# Patient Record
Sex: FEMALE | URBAN - METROPOLITAN AREA
[De-identification: names, ages, dates, MRNs, and addresses within clinical notes are randomized per-mention and may not be internally consistent; named-entity substitution may affect disease eponyms.]

---

## 2018-05-14 ENCOUNTER — APPOINTMENT (RX ONLY)
Dept: URBAN - METROPOLITAN AREA CLINIC 26 | Facility: CLINIC | Age: 63
Setting detail: DERMATOLOGY
End: 2018-05-14

## 2018-05-14 VITALS — SYSTOLIC BLOOD PRESSURE: 112 MMHG | HEART RATE: 86 BPM | DIASTOLIC BLOOD PRESSURE: 77 MMHG

## 2018-05-14 PROBLEM — Z85.828 PERSONAL HISTORY OF OTHER MALIGNANT NEOPLASM OF SKIN: Status: ACTIVE | Noted: 2018-05-14

## 2018-05-14 PROBLEM — C44.319 BASAL CELL CARCINOMA OF SKIN OF OTHER PARTS OF FACE: Status: ACTIVE | Noted: 2018-05-14

## 2018-05-14 PROBLEM — K21.9 GASTRO-ESOPHAGEAL REFLUX DISEASE WITHOUT ESOPHAGITIS: Status: ACTIVE | Noted: 2018-05-14

## 2018-05-14 PROCEDURE — 13132 CMPLX RPR F/C/C/M/N/AX/G/H/F: CPT

## 2018-05-14 PROCEDURE — ? MOHS SURGERY

## 2018-05-14 PROCEDURE — 17311 MOHS 1 STAGE H/N/HF/G: CPT

## 2018-05-14 NOTE — PROCEDURE: MOHS SURGERY
Bcc Superficial Histology Text: The dermis contains distinctive tumor cell masses of various shapes and sizes composed of cells with large oval or elongated nuclei with relatively little cytoplasm.  The nuclei are homogenous.  There is no pronounced variation in size or intensity of staining and no significant anaplastic appearance.  The cells at the outer aspect of the tumor masses show palisading of nuclei.  Tumor masses are surrounded by a connective tissue stroma and in some areas there is retraction artifact of the tumor nodule away from the surrounding stroma.  A superficial pattern is noted.

## 2018-05-14 NOTE — PROCEDURE: MOHS SURGERY
Mixed Superficial And Nodular Bcc Histology Text: The dermis contains distinctive tumor cell masses of various shapes and sizes composed of cells with large oval or elongated nuclei with relatively little cytoplasm.  The nuclei are homogenous.  There is no pronounced variation in size or intensity of staining and no significant anaplastic appearance.  The cells at the outer aspect of the tumor masses show palisading of nuclei.  Tumor masses are surrounded by a connective tissue stroma and in some areas there is retraction artifact of the tumor nodule away from the surrounding stroma.  A mixed superficial and nodular pattern is noted.

## 2020-06-22 NOTE — PROCEDURE: MOHS SURGERY
pt has heart failure, bp run high Consent (Spinal Accessory)/Introductory Paragraph: The rationale for Mohs was explained to the patient and consent was obtained. The risks, benefits and alternatives to therapy were discussed in detail. Specifically, the risks of damage to the spinal accessory nerve, infection, scarring, bleeding, prolonged wound healing, incomplete removal, allergy to anesthesia, and recurrence were addressed. Prior to the procedure, the treatment site was clearly identified and confirmed by the patient using a hand mirror. All components of Universal Protocol/PAUSE Rule completed.

## 2020-12-16 ENCOUNTER — APPOINTMENT (RX ONLY)
Dept: URBAN - METROPOLITAN AREA CLINIC 374 | Facility: CLINIC | Age: 65
Setting detail: DERMATOLOGY
End: 2020-12-16

## 2020-12-16 DIAGNOSIS — L57.0 ACTINIC KERATOSIS: ICD-10-CM

## 2020-12-16 PROBLEM — C44.529 SQUAMOUS CELL CARCINOMA OF SKIN OF OTHER PART OF TRUNK: Status: ACTIVE | Noted: 2020-12-16

## 2020-12-16 PROCEDURE — ? PRESCRIPTION MEDICATION MANAGEMENT

## 2020-12-16 PROCEDURE — 99213 OFFICE O/P EST LOW 20 MIN: CPT | Mod: 25

## 2020-12-16 PROCEDURE — 17262 DSTRJ MAL LES T/A/L 1.1-2.0: CPT

## 2020-12-16 PROCEDURE — ? COUNSELING

## 2020-12-16 PROCEDURE — ? CURETTAGE AND DESTRUCTION WITH PATHOLOGY

## 2020-12-16 PROCEDURE — ? PRESCRIPTION

## 2020-12-16 PROCEDURE — ? PHOTO-DOCUMENTATION

## 2020-12-16 RX ORDER — FLUOROURACIL 5 MG/G
CREAM TOPICAL QHS
Qty: 1 | Refills: 2 | Status: CANCELLED
Stop reason: CLARIF

## 2020-12-16 ASSESSMENT — LOCATION SIMPLE DESCRIPTION DERM
LOCATION SIMPLE: RIGHT CHEEK
LOCATION SIMPLE: RIGHT FOREHEAD
LOCATION SIMPLE: LEFT CHEEK

## 2020-12-16 ASSESSMENT — LOCATION ZONE DERM: LOCATION ZONE: FACE

## 2020-12-16 ASSESSMENT — LOCATION DETAILED DESCRIPTION DERM
LOCATION DETAILED: RIGHT INFERIOR LATERAL MALAR CHEEK
LOCATION DETAILED: LEFT INFERIOR CENTRAL MALAR CHEEK
LOCATION DETAILED: RIGHT MEDIAL FOREHEAD

## 2020-12-16 NOTE — PROCEDURE: PRESCRIPTION MEDICATION MANAGEMENT
Initiate Treatment: fluorouracil 5 % topical cream: Apply a thin layer QHS to AA of FACE x 4 weeks
Render In Strict Bullet Format?: No
Detail Level: Zone

## 2020-12-16 NOTE — PROCEDURE: CURETTAGE AND DESTRUCTION WITH PATHOLOGY
Detail Level: Detailed
Size Of Lesion After Curettage: 1.6
Anesthesia Type: 1% lidocaine with epinephrine
Cautery Type: electrodesiccation
Number Of Curettages: 1
What Was Performed First?: Curettage
Additional Information: (Optional): The wound was cleaned, and a pressure dressing was applied.  The patient received detailed post-op instructions.
Lab: 6
Histology Text: Following the procedure a portion of the curetted material was sent for histologic evaluation.
Biopsy Type: H and E
Path Notes (To The Dermatopathologist): Please check margins
Render Path Notes In Note?: No
Consent was obtained from the patient. The risks, benefits and alternatives to therapy were discussed in detail. Specifically, the risks of infection, scarring, bleeding, prolonged wound healing, nerve injury, incomplete removal, allergy to anesthesia and recurrence were addressed. Alternatives to ED&C, such as: surgical removal and XRT were also discussed.  Prior to the procedure, the treatment site was clearly identified and confirmed by the patient. All components of Universal Protocol/PAUSE Rule completed.
Post-Care Instructions: I reviewed with the patient in detail post-care instructions. Patient is to keep the area dry for 48 hours, and not to engage in any swimming until the area is healed. Should the patient develop any fevers, chills, bleeding, severe pain patient will contact the office immediately.
Billing Type: Third-Party Bill
Bill As A Line Item Or As Units: Line Item

## 2021-01-21 NOTE — PROCEDURE: MOHS SURGERY
PDMP reviewed; no aberrant behavior identified, prescription authorized. Keystone Flap Text: The defect edges were debeveled with a #15 scalpel blade.  Given the location of the defect, shape of the defect a keystone flap was deemed most appropriate.  Using a sterile surgical marker, an appropriate keystone flap was drawn incorporating the defect, outlining the appropriate donor tissue and placing the expected incisions within the relaxed skin tension lines where possible. The area thus outlined was incised deep to adipose tissue with a #15 scalpel blade.  The skin margins were undermined to an appropriate distance in all directions around the primary defect and laterally outward around the flap utilizing iris scissors.

## 2021-01-27 ENCOUNTER — APPOINTMENT (RX ONLY)
Dept: URBAN - METROPOLITAN AREA CLINIC 374 | Facility: CLINIC | Age: 66
Setting detail: DERMATOLOGY
End: 2021-01-27

## 2021-01-27 DIAGNOSIS — L57.0 ACTINIC KERATOSIS: ICD-10-CM

## 2021-01-27 PROBLEM — C44.92 SQUAMOUS CELL CARCINOMA OF SKIN, UNSPECIFIED: Status: ACTIVE | Noted: 2021-01-27

## 2021-01-27 PROCEDURE — ? PHOTO-DOCUMENTATION

## 2021-01-27 PROCEDURE — ? COUNSELING

## 2021-01-27 PROCEDURE — ? PRESCRIPTION MEDICATION MANAGEMENT

## 2021-01-27 PROCEDURE — ? LIQUID NITROGEN

## 2021-01-27 PROCEDURE — 99213 OFFICE O/P EST LOW 20 MIN: CPT | Mod: 25

## 2021-01-27 PROCEDURE — 17003 DESTRUCT PREMALG LES 2-14: CPT

## 2021-01-27 PROCEDURE — 17000 DESTRUCT PREMALG LESION: CPT

## 2021-01-27 ASSESSMENT — LOCATION SIMPLE DESCRIPTION DERM
LOCATION SIMPLE: LEFT CHEEK
LOCATION SIMPLE: LEFT FOREHEAD
LOCATION SIMPLE: RIGHT CHEEK
LOCATION SIMPLE: RIGHT FOREHEAD
LOCATION SIMPLE: LEFT NOSE

## 2021-01-27 ASSESSMENT — LOCATION DETAILED DESCRIPTION DERM
LOCATION DETAILED: RIGHT INFERIOR LATERAL MALAR CHEEK
LOCATION DETAILED: RIGHT SUPERIOR NASAL CHEEK
LOCATION DETAILED: RIGHT MEDIAL FOREHEAD
LOCATION DETAILED: LEFT FOREHEAD
LOCATION DETAILED: LEFT NASAL SIDEWALL
LOCATION DETAILED: LEFT INFERIOR CENTRAL MALAR CHEEK

## 2021-01-27 ASSESSMENT — LOCATION ZONE DERM
LOCATION ZONE: FACE
LOCATION ZONE: NOSE

## 2021-01-27 NOTE — PROCEDURE: LIQUID NITROGEN
Consent: The patient's consent was obtained including but not limited to risks of crusting, scabbing, blistering, scarring, darker or lighter pigmentary change, recurrence, incomplete removal and infection.
Post-Care Instructions: I reviewed with the patient in detail post-care instructions. Patient is to wear sunprotection, and avoid picking at any of the treated lesions. Pt may apply Vaseline to crusted or scabbing areas.
Render Post-Care Instructions In Note?: no
Number Of Freeze-Thaw Cycles: 1 freeze-thaw cycle
Detail Level: Simple
Duration Of Freeze Thaw-Cycle (Seconds): 0

## 2021-01-27 NOTE — PROCEDURE: PRESCRIPTION MEDICATION MANAGEMENT
Initiate Treatment: Calciprotriene.005%/fluorouracil 5 % topical cream: apply a thin layer to face bid for a week, take a week off, and then if not healed do for another week
Render In Strict Bullet Format?: No
Detail Level: Zone

## 2021-03-10 ENCOUNTER — APPOINTMENT (RX ONLY)
Dept: URBAN - METROPOLITAN AREA CLINIC 374 | Facility: CLINIC | Age: 66
Setting detail: DERMATOLOGY
End: 2021-03-10

## 2021-03-10 DIAGNOSIS — B07.8 OTHER VIRAL WARTS: ICD-10-CM

## 2021-03-10 DIAGNOSIS — L57.0 ACTINIC KERATOSIS: ICD-10-CM

## 2021-03-10 PROCEDURE — 17110 DESTRUCTION B9 LES UP TO 14: CPT

## 2021-03-10 PROCEDURE — ? LIQUID NITROGEN

## 2021-03-10 PROCEDURE — ? PRESCRIPTION MEDICATION MANAGEMENT

## 2021-03-10 PROCEDURE — 99212 OFFICE O/P EST SF 10 MIN: CPT | Mod: 25

## 2021-03-10 ASSESSMENT — LOCATION SIMPLE DESCRIPTION DERM
LOCATION SIMPLE: LEFT CHEEK
LOCATION SIMPLE: RIGHT MIDDLE FINGER
LOCATION SIMPLE: RIGHT FOREHEAD
LOCATION SIMPLE: RIGHT CHEEK

## 2021-03-10 ASSESSMENT — LOCATION DETAILED DESCRIPTION DERM
LOCATION DETAILED: LEFT INFERIOR CENTRAL MALAR CHEEK
LOCATION DETAILED: LEFT MEDIAL MALAR CHEEK
LOCATION DETAILED: RIGHT PROXIMAL PALMAR MIDDLE FINGER
LOCATION DETAILED: RIGHT MEDIAL FOREHEAD
LOCATION DETAILED: RIGHT INFERIOR LATERAL MALAR CHEEK

## 2021-03-10 ASSESSMENT — LOCATION ZONE DERM
LOCATION ZONE: FINGER
LOCATION ZONE: FACE

## 2021-03-10 NOTE — PROCEDURE: PRESCRIPTION MEDICATION MANAGEMENT
Render In Strict Bullet Format?: No
Detail Level: Zone
Discontinue Regimen: Calciprotriene.005%/fluorouracil 5 % topical cream: apply a thin layer to face bid for a week, take a week off, and then if not healed do for another week (finished treatment)
Plan:  will mail celacyn

## 2021-08-25 ENCOUNTER — APPOINTMENT (RX ONLY)
Dept: URBAN - METROPOLITAN AREA CLINIC 374 | Facility: CLINIC | Age: 66
Setting detail: DERMATOLOGY
End: 2021-08-25

## 2021-08-25 DIAGNOSIS — D69.2 OTHER NONTHROMBOCYTOPENIC PURPURA: ICD-10-CM

## 2021-08-25 DIAGNOSIS — Z85.828 PERSONAL HISTORY OF OTHER MALIGNANT NEOPLASM OF SKIN: ICD-10-CM

## 2021-08-25 PROCEDURE — ? COUNSELING

## 2021-08-25 PROCEDURE — ? ADDITIONAL NOTES

## 2021-08-25 PROCEDURE — 99212 OFFICE O/P EST SF 10 MIN: CPT

## 2021-08-25 PROCEDURE — ? PRESCRIPTION MEDICATION MANAGEMENT

## 2021-08-25 PROCEDURE — ? PRESCRIPTION

## 2021-08-25 RX ORDER — TRIAMCINOLONE ACETONIDE 1 MG/G
CREAM TOPICAL BID
Qty: 1 | Refills: 3 | Status: ERX | COMMUNITY
Start: 2021-08-25

## 2021-08-25 RX ADMIN — TRIAMCINOLONE ACETONIDE: 1 CREAM TOPICAL at 00:00

## 2021-08-25 ASSESSMENT — LOCATION ZONE DERM
LOCATION ZONE: LEG
LOCATION ZONE: FACE

## 2021-08-25 ASSESSMENT — LOCATION SIMPLE DESCRIPTION DERM
LOCATION SIMPLE: RIGHT PRETIBIAL REGION
LOCATION SIMPLE: LEFT PRETIBIAL REGION
LOCATION SIMPLE: RIGHT EYEBROW

## 2021-08-25 ASSESSMENT — LOCATION DETAILED DESCRIPTION DERM
LOCATION DETAILED: LEFT PROXIMAL PRETIBIAL REGION
LOCATION DETAILED: RIGHT DISTAL PRETIBIAL REGION
LOCATION DETAILED: RIGHT CENTRAL EYEBROW

## 2021-08-25 NOTE — PROCEDURE: PRESCRIPTION MEDICATION MANAGEMENT
Detail Level: Zone
Render In Strict Bullet Format?: No
Initiate Treatment: triamcinolone acetonide 0.1 % topical cream: Apply bid to the rashes of the legs

## 2021-09-14 ENCOUNTER — APPOINTMENT (RX ONLY)
Dept: URBAN - METROPOLITAN AREA CLINIC 28 | Facility: CLINIC | Age: 66
Setting detail: DERMATOLOGY
End: 2021-09-14

## 2021-09-14 DIAGNOSIS — D69.2 OTHER NONTHROMBOCYTOPENIC PURPURA: ICD-10-CM | Status: IMPROVED

## 2021-09-14 DIAGNOSIS — L57.0 ACTINIC KERATOSIS: ICD-10-CM

## 2021-09-14 PROCEDURE — ? COUNSELING

## 2021-09-14 PROCEDURE — 99212 OFFICE O/P EST SF 10 MIN: CPT | Mod: 25

## 2021-09-14 PROCEDURE — ? PREMALIGNANT DESTRUCTION

## 2021-09-14 PROCEDURE — ? RECOMMENDATIONS

## 2021-09-14 PROCEDURE — 17003 DESTRUCT PREMALG LES 2-14: CPT

## 2021-09-14 PROCEDURE — 17000 DESTRUCT PREMALG LESION: CPT

## 2021-09-14 PROCEDURE — ? PRESCRIPTION MEDICATION MANAGEMENT

## 2021-09-14 ASSESSMENT — LOCATION SIMPLE DESCRIPTION DERM
LOCATION SIMPLE: LEFT PRETIBIAL REGION
LOCATION SIMPLE: RIGHT UPPER ARM
LOCATION SIMPLE: RIGHT PRETIBIAL REGION
LOCATION SIMPLE: LEFT THIGH
LOCATION SIMPLE: RIGHT HAND

## 2021-09-14 ASSESSMENT — LOCATION DETAILED DESCRIPTION DERM
LOCATION DETAILED: RIGHT DISTAL PRETIBIAL REGION
LOCATION DETAILED: RIGHT ULNAR DORSAL HAND
LOCATION DETAILED: LEFT PROXIMAL PRETIBIAL REGION
LOCATION DETAILED: RIGHT ANTERIOR LATERAL DISTAL UPPER ARM
LOCATION DETAILED: LEFT ANTERIOR DISTAL THIGH

## 2021-09-14 ASSESSMENT — TOTAL NUMBER OF LESIONS: # OF LESIONS?: 2

## 2021-09-14 ASSESSMENT — LOCATION ZONE DERM
LOCATION ZONE: HAND
LOCATION ZONE: LEG
LOCATION ZONE: ARM

## 2021-09-14 NOTE — PROCEDURE: PRESCRIPTION MEDICATION MANAGEMENT
Detail Level: Zone
Continue Regimen: triamcinolone acetonide 0.1 % cream: Apply bid to the rashes of the legs
Render In Strict Bullet Format?: No

## 2021-09-14 NOTE — PROCEDURE: PREMALIGNANT DESTRUCTION
Render Note In Bullet Format When Appropriate: No
Consent: The patient's consent was obtained including but not limited to risks of crusting, scabbing, blistering, scarring, darker or lighter pigmentary change, recurrence, incomplete removal and infection.
Anesthesia Volume In Cc: 0
Detail Level: Detailed
Post-Care Instructions: I reviewed with the patient in detail post-care instructions. Patient is to wear sunprotection, and avoid picking at any of the treated lesions. Pt may apply Vaseline to crusted or scabbing areas.
Method: liquid nitrogen

## 2021-09-14 NOTE — PROCEDURE: RECOMMENDATIONS
Detail Level: Zone
Render Risk Assessment In Note?: no
Recommendation Preamble: The following recommendations were made during the visit: OTC DerMend Moisturizing Bruise cream

## 2022-05-04 ENCOUNTER — APPOINTMENT (RX ONLY)
Dept: URBAN - METROPOLITAN AREA CLINIC 374 | Facility: CLINIC | Age: 67
Setting detail: DERMATOLOGY
End: 2022-05-04

## 2022-05-04 DIAGNOSIS — D69.2 OTHER NONTHROMBOCYTOPENIC PURPURA: ICD-10-CM | Status: STABLE

## 2022-05-04 DIAGNOSIS — L57.0 ACTINIC KERATOSIS: ICD-10-CM

## 2022-05-04 PROCEDURE — ? PRESCRIPTION MEDICATION MANAGEMENT

## 2022-05-04 PROCEDURE — ? PRESCRIPTION

## 2022-05-04 PROCEDURE — ? RECOMMENDATIONS

## 2022-05-04 PROCEDURE — 99212 OFFICE O/P EST SF 10 MIN: CPT | Mod: 25

## 2022-05-04 PROCEDURE — 17003 DESTRUCT PREMALG LES 2-14: CPT

## 2022-05-04 PROCEDURE — ? PREMALIGNANT DESTRUCTION

## 2022-05-04 PROCEDURE — 17000 DESTRUCT PREMALG LESION: CPT

## 2022-05-04 RX ORDER — EMOLLIENT COMBINATION NO.32
1 EMULSION, EXTENDED RELEASE TOPICAL
Qty: 225 | Refills: 3 | Status: ERX

## 2022-05-04 RX ORDER — FLUOROURACIL 40 MG/G
1 CREAM TOPICAL QHS
Qty: 40 | Refills: 2 | Status: ERX | COMMUNITY
Start: 2022-05-04

## 2022-05-04 RX ADMIN — FLUOROURACIL 1: 40 CREAM TOPICAL at 00:00

## 2022-05-04 ASSESSMENT — LOCATION ZONE DERM
LOCATION ZONE: LEG
LOCATION ZONE: TRUNK
LOCATION ZONE: ARM
LOCATION ZONE: FACE

## 2022-05-04 ASSESSMENT — LOCATION DETAILED DESCRIPTION DERM
LOCATION DETAILED: RIGHT SUPERIOR MEDIAL UPPER BACK
LOCATION DETAILED: LEFT LATERAL SUPERIOR CHEST
LOCATION DETAILED: RIGHT DISTAL PRETIBIAL REGION
LOCATION DETAILED: RIGHT DISTAL RADIAL DORSAL FOREARM
LOCATION DETAILED: LEFT PROXIMAL PRETIBIAL REGION
LOCATION DETAILED: RIGHT SUPERIOR UPPER BACK
LOCATION DETAILED: LEFT INFERIOR MEDIAL FOREHEAD
LOCATION DETAILED: RIGHT MEDIAL SUPERIOR CHEST

## 2022-05-04 ASSESSMENT — LOCATION SIMPLE DESCRIPTION DERM
LOCATION SIMPLE: LEFT FOREHEAD
LOCATION SIMPLE: RIGHT FOREARM
LOCATION SIMPLE: RIGHT PRETIBIAL REGION
LOCATION SIMPLE: RIGHT UPPER BACK
LOCATION SIMPLE: LEFT PRETIBIAL REGION
LOCATION SIMPLE: CHEST

## 2022-05-04 NOTE — PROCEDURE: PRESCRIPTION MEDICATION MANAGEMENT
Detail Level: Zone
Continue Regimen: triamcinolone acetonide 0.1 % cream: Apply bid to the rashes of the legs
Samples Given: Epiceram cream and cutemol emollient moisturizer
Render In Strict Bullet Format?: No
Initiate Treatment: Tolak 4 % cream: Use once a day at bed time/overnight for 30 days to affected areas on chest (one area at a time)

## 2022-05-04 NOTE — PROCEDURE: PREMALIGNANT DESTRUCTION
Detail Level: Detailed
Anesthesia Volume In Cc: 0
Render Post-Care In The Note: No
Consent: The patient's consent was obtained including but not limited to risks of crusting, scabbing, blistering, scarring, darker or lighter pigmentary change, recurrence, incomplete removal and infection.
Method: liquid nitrogen
Post-Care Instructions: I reviewed with the patient in detail post-care instructions. Patient is to wear sunprotection, and avoid picking at any of the treated lesions. Pt may apply Vaseline to crusted or scabbing areas.

## 2022-08-24 ENCOUNTER — APPOINTMENT (RX ONLY)
Dept: URBAN - METROPOLITAN AREA CLINIC 374 | Facility: CLINIC | Age: 67
Setting detail: DERMATOLOGY
End: 2022-08-24

## 2022-08-24 DIAGNOSIS — D69.2 OTHER NONTHROMBOCYTOPENIC PURPURA: ICD-10-CM

## 2022-08-24 DIAGNOSIS — L57.0 ACTINIC KERATOSIS: ICD-10-CM

## 2022-08-24 PROCEDURE — ? RECOMMENDATIONS

## 2022-08-24 PROCEDURE — 99213 OFFICE O/P EST LOW 20 MIN: CPT

## 2022-08-24 PROCEDURE — ? PRESCRIPTION MEDICATION MANAGEMENT

## 2022-08-24 ASSESSMENT — LOCATION DETAILED DESCRIPTION DERM
LOCATION DETAILED: RIGHT DISTAL RADIAL DORSAL FOREARM
LOCATION DETAILED: RIGHT DISTAL PRETIBIAL REGION
LOCATION DETAILED: LEFT PROXIMAL PRETIBIAL REGION
LOCATION DETAILED: LEFT INFERIOR MEDIAL FOREHEAD

## 2022-08-24 ASSESSMENT — LOCATION SIMPLE DESCRIPTION DERM
LOCATION SIMPLE: RIGHT PRETIBIAL REGION
LOCATION SIMPLE: LEFT PRETIBIAL REGION
LOCATION SIMPLE: RIGHT FOREARM
LOCATION SIMPLE: LEFT FOREHEAD

## 2022-08-24 ASSESSMENT — LOCATION ZONE DERM
LOCATION ZONE: FACE
LOCATION ZONE: ARM
LOCATION ZONE: LEG

## 2022-08-24 NOTE — PROCEDURE: PRESCRIPTION MEDICATION MANAGEMENT
Discontinue Regimen: Tolak 4 % cream: Use once a day at bed time/overnight for 30 days to affected areas on chest (one area at a time)
Detail Level: Zone
Render In Strict Bullet Format?: No
Samples Given: Christin
Initiate Treatment: Klysiri: Apply thin layer to forehead QHS x5 days (start in Fall)
Continue Regimen: triamcinolone acetonide 0.1 % cream: Apply bid to the rashes of the legs\\nEpiceram cream and cutemol emollient moisturizer
Abdominal Pain, N/V/D

## 2023-01-17 ENCOUNTER — RX ONLY (OUTPATIENT)
Age: 68
Setting detail: RX ONLY
End: 2023-01-17

## 2023-01-17 RX ORDER — EMOLLIENT COMBINATION NO.32
1 EMULSION, EXTENDED RELEASE TOPICAL
Qty: 225 | Refills: 3 | Status: ERX

## 2023-01-17 RX ORDER — TIRBANIBULIN 10 MG/G
OINTMENT TOPICAL
Qty: 5 | Refills: 0 | Status: ERX | COMMUNITY
Start: 2023-01-17

## 2023-02-01 ENCOUNTER — RX ONLY (OUTPATIENT)
Age: 68
Setting detail: RX ONLY
End: 2023-02-01

## 2023-02-01 RX ORDER — IMIQUIMOD 12.5 MG/.25G
CREAM TOPICAL
Qty: 24 | Refills: 0 | Status: ERX | COMMUNITY
Start: 2023-02-01

## 2023-04-05 ENCOUNTER — APPOINTMENT (RX ONLY)
Dept: URBAN - METROPOLITAN AREA CLINIC 374 | Facility: CLINIC | Age: 68
Setting detail: DERMATOLOGY
End: 2023-04-05

## 2023-04-05 DIAGNOSIS — L57.0 ACTINIC KERATOSIS: ICD-10-CM

## 2023-04-05 PROCEDURE — 17000 DESTRUCT PREMALG LESION: CPT

## 2023-04-05 PROCEDURE — ? PREMALIGNANT DESTRUCTION

## 2023-04-05 PROCEDURE — ? PRESCRIPTION MEDICATION MANAGEMENT

## 2023-04-05 PROCEDURE — 17003 DESTRUCT PREMALG LES 2-14: CPT

## 2023-04-05 ASSESSMENT — LOCATION DETAILED DESCRIPTION DERM
LOCATION DETAILED: RIGHT RADIAL DORSAL HAND
LOCATION DETAILED: RIGHT CENTRAL EYEBROW
LOCATION DETAILED: LEFT INFERIOR MEDIAL FOREHEAD
LOCATION DETAILED: LEFT INFERIOR LATERAL FOREHEAD
LOCATION DETAILED: LEFT FOREHEAD

## 2023-04-05 ASSESSMENT — LOCATION ZONE DERM
LOCATION ZONE: HAND
LOCATION ZONE: FACE

## 2023-04-05 ASSESSMENT — LOCATION SIMPLE DESCRIPTION DERM
LOCATION SIMPLE: RIGHT EYEBROW
LOCATION SIMPLE: LEFT FOREHEAD
LOCATION SIMPLE: RIGHT HAND

## 2023-04-05 NOTE — PROCEDURE: PRESCRIPTION MEDICATION MANAGEMENT
Discontinue Regimen: Klysiri: Apply thin layer to forehead QHS x5 days (start in Fall)
Detail Level: Zone
Render In Strict Bullet Format?: No

## 2023-04-05 NOTE — PROCEDURE: PREMALIGNANT DESTRUCTION
Post-Care Instructions: I reviewed with the patient in detail post-care instructions. Patient is to wear sunprotection, and avoid picking at any of the treated lesions. Pt may apply Vaseline to crusted or scabbing areas.
Anesthesia Volume In Cc: 0
Render Note In Bullet Format When Appropriate: No
Detail Level: Detailed
Method: liquid nitrogen
Consent: The patient's consent was obtained including but not limited to risks of crusting, scabbing, blistering, scarring, darker or lighter pigmentary change, recurrence, incomplete removal and infection.

## 2023-04-27 ENCOUNTER — TRANSCRIBE ORDERS (OUTPATIENT)
Dept: SCHEDULING | Age: 68
End: 2023-04-27

## 2023-04-27 DIAGNOSIS — M54.16 RADICULOPATHY, LUMBAR REGION: Primary | ICD-10-CM

## 2023-05-09 ENCOUNTER — HOSPITAL ENCOUNTER (OUTPATIENT)
Dept: RADIOLOGY | Facility: HOSPITAL | Age: 68
Discharge: HOME | End: 2023-05-09
Attending: PHYSICAL MEDICINE & REHABILITATION
Payer: MEDICARE

## 2023-05-09 DIAGNOSIS — M54.16 RADICULOPATHY, LUMBAR REGION: ICD-10-CM

## 2023-05-23 ENCOUNTER — HOSPITAL ENCOUNTER (OUTPATIENT)
Dept: RADIOLOGY | Facility: HOSPITAL | Age: 68
Discharge: HOME | End: 2023-05-23
Attending: PHYSICAL MEDICINE & REHABILITATION
Payer: MEDICARE

## 2023-05-23 DIAGNOSIS — M54.16 RADICULOPATHY, LUMBAR REGION: ICD-10-CM

## 2023-05-23 DIAGNOSIS — M25.552 PAIN IN LEFT HIP: ICD-10-CM

## 2023-05-23 PROCEDURE — 74018 RADEX ABDOMEN 1 VIEW: CPT

## 2023-08-23 ENCOUNTER — APPOINTMENT (RX ONLY)
Dept: URBAN - METROPOLITAN AREA CLINIC 374 | Facility: CLINIC | Age: 68
Setting detail: DERMATOLOGY
End: 2023-08-23

## 2023-08-23 DIAGNOSIS — L57.0 ACTINIC KERATOSIS: ICD-10-CM

## 2023-08-23 PROCEDURE — ? PRESCRIPTION MEDICATION MANAGEMENT

## 2023-08-23 PROCEDURE — ? PRESCRIPTION

## 2023-08-23 PROCEDURE — 99213 OFFICE O/P EST LOW 20 MIN: CPT

## 2023-08-23 RX ORDER — FLUOROURACIL 2 G/40G
1 CREAM TOPICAL QHS
Qty: 40 | Refills: 0 | Status: ERX | COMMUNITY
Start: 2023-08-23

## 2023-08-23 RX ADMIN — FLUOROURACIL 1: 2 CREAM TOPICAL at 00:00

## 2023-08-23 ASSESSMENT — LOCATION SIMPLE DESCRIPTION DERM
LOCATION SIMPLE: RIGHT EYEBROW
LOCATION SIMPLE: LEFT FOREHEAD

## 2023-08-23 ASSESSMENT — LOCATION DETAILED DESCRIPTION DERM
LOCATION DETAILED: LEFT INFERIOR MEDIAL FOREHEAD
LOCATION DETAILED: RIGHT CENTRAL EYEBROW
LOCATION DETAILED: LEFT FOREHEAD
LOCATION DETAILED: LEFT INFERIOR LATERAL FOREHEAD

## 2023-08-23 ASSESSMENT — LOCATION ZONE DERM: LOCATION ZONE: FACE

## 2023-08-23 NOTE — PROCEDURE: MIPS QUALITY
Detail Level: Detailed
Quality 111:Pneumonia Vaccination Status For Older Adults: Patient received any pneumococcal conjugate or polysaccharide vaccine on or after their 60th birthday and before the end of the measurement period
Quality 226: Preventive Care And Screening: Tobacco Use: Screening And Cessation Intervention: Patient screened for tobacco use and is an ex/non-smoker
Quality 431: Preventive Care And Screening: Unhealthy Alcohol Use - Screening: Patient not identified as an unhealthy alcohol user when screened for unhealthy alcohol use using a systematic screening method
Quality 47: Advance Care Plan: Advance Care Planning discussed and documented in the medical record; patient did not wish or was not able to name a surrogate decision maker or provide an advance care plan.

## 2023-08-23 NOTE — PROCEDURE: PRESCRIPTION MEDICATION MANAGEMENT
Detail Level: Zone
Render In Strict Bullet Format?: No
Initiate Treatment: Efudex 5 % topical cream Qhs: Use once a day QHS to AA or face ( Use once daily 2x week then after [place]  then twice daily for 2 weeks )

## 2023-08-30 ENCOUNTER — TRANSCRIBE ORDERS (OUTPATIENT)
Dept: REGISTRATION | Facility: REHABILITATION | Age: 68
End: 2023-08-30

## 2023-08-30 DIAGNOSIS — S06.0X0S CONCUSSION WITHOUT LOSS OF CONSCIOUSNESS, SEQUELA (CMS/HCC): Primary | ICD-10-CM

## 2023-08-30 DIAGNOSIS — R51.9 HEADACHE: ICD-10-CM

## 2023-08-30 DIAGNOSIS — R42 DIZZINESS AND GIDDINESS: ICD-10-CM

## 2023-08-31 ENCOUNTER — DOCUMENTATION (OUTPATIENT)
Dept: SOCIAL WORK | Facility: REHABILITATION | Age: 68
End: 2023-08-31
Payer: MEDICARE

## 2023-08-31 ENCOUNTER — TRANSCRIBE ORDERS (OUTPATIENT)
Dept: REGISTRATION | Facility: REHABILITATION | Age: 68
End: 2023-08-31

## 2023-08-31 DIAGNOSIS — S06.0X0S CONCUSSION WITHOUT LOSS OF CONSCIOUSNESS, SEQUELA (CMS/HCC): Primary | ICD-10-CM

## 2023-12-05 ENCOUNTER — APPOINTMENT (RX ONLY)
Dept: URBAN - METROPOLITAN AREA CLINIC 374 | Facility: CLINIC | Age: 68
Setting detail: DERMATOLOGY
End: 2023-12-05

## 2023-12-05 DIAGNOSIS — Z85.828 PERSONAL HISTORY OF OTHER MALIGNANT NEOPLASM OF SKIN: ICD-10-CM

## 2023-12-05 DIAGNOSIS — L57.0 ACTINIC KERATOSIS: ICD-10-CM | Status: INADEQUATELY CONTROLLED

## 2023-12-05 PROCEDURE — 17000 DESTRUCT PREMALG LESION: CPT

## 2023-12-05 PROCEDURE — 17003 DESTRUCT PREMALG LES 2-14: CPT

## 2023-12-05 PROCEDURE — ? PRESCRIPTION

## 2023-12-05 PROCEDURE — ? LIQUID NITROGEN

## 2023-12-05 PROCEDURE — ? ADDITIONAL NOTES

## 2023-12-05 PROCEDURE — 99212 OFFICE O/P EST SF 10 MIN: CPT | Mod: 25

## 2023-12-05 PROCEDURE — ? PRESCRIPTION MEDICATION MANAGEMENT

## 2023-12-05 PROCEDURE — ? COUNSELING

## 2023-12-05 PROCEDURE — ? PHOTO-DOCUMENTATION

## 2023-12-05 RX ORDER — FLUOROURACIL 5 MG/G
CREAM TOPICAL
Qty: 40 | Refills: 1 | Status: ERX | COMMUNITY
Start: 2023-12-05

## 2023-12-05 RX ADMIN — FLUOROURACIL: 5 CREAM TOPICAL at 00:00

## 2023-12-05 ASSESSMENT — LOCATION ZONE DERM
LOCATION ZONE: HAND
LOCATION ZONE: TRUNK
LOCATION ZONE: FACE

## 2023-12-05 ASSESSMENT — LOCATION DETAILED DESCRIPTION DERM
LOCATION DETAILED: LEFT INFERIOR LATERAL FOREHEAD
LOCATION DETAILED: RIGHT MEDIAL SUPERIOR CHEST
LOCATION DETAILED: RIGHT DORSAL MIDDLE FINGER METACARPOPHALANGEAL JOINT
LOCATION DETAILED: LEFT FOREHEAD
LOCATION DETAILED: RIGHT RADIAL DORSAL HAND
LOCATION DETAILED: LEFT CENTRAL TEMPLE
LOCATION DETAILED: LEFT INFERIOR MEDIAL FOREHEAD
LOCATION DETAILED: RIGHT CENTRAL EYEBROW

## 2023-12-05 ASSESSMENT — LOCATION SIMPLE DESCRIPTION DERM
LOCATION SIMPLE: LEFT FOREHEAD
LOCATION SIMPLE: CHEST
LOCATION SIMPLE: RIGHT EYEBROW
LOCATION SIMPLE: RIGHT HAND
LOCATION SIMPLE: LEFT TEMPLE

## 2023-12-05 NOTE — PROCEDURE: PRESCRIPTION MEDICATION MANAGEMENT
Detail Level: Zone
Render In Strict Bullet Format?: No
Initiate Treatment: 5-Fluorouracil cream: Apply to affected skin twice a day for 2-4 weeks until erosion occurs, can apply hydrocortisone 2.5 % ointment for discomfort secondary to the inflammatory response due to 5-FU treatment
Defer Treatment (Provide Reason For Deferment In Text Field Below): Patient declined cryotherapy today

## 2023-12-05 NOTE — PROCEDURE: LIQUID NITROGEN
Consent: The patient's consent was obtained including but not limited to risks of crusting, scabbing, blistering, scarring, darker or lighter pigmentary change, recurrence, incomplete removal and infection.
Duration Of Freeze Thaw-Cycle (Seconds): 20
Number Of Freeze-Thaw Cycles: 2 freeze-thaw cycles
Detail Level: Detailed
Show Aperture Variable?: Yes
Render Note In Bullet Format When Appropriate: No
Post-Care Instructions: I reviewed with the patient in detail post-care instructions. Patient is to wear sunprotection, and avoid picking at any of the treated lesions. Pt may apply Vaseline to crusted or scabbing areas.

## 2023-12-05 NOTE — PROCEDURE: ADDITIONAL NOTES
Detail Level: Zone
Render Risk Assessment In Note?: no
Additional Notes: History of Squamous cell carcinoma (SCC) on the right medial superior chest status post electrodessication and curettage performed on 12/16/20 by Dr. Nice.\\nOn exam today there is no evidence of recurrence.\\nPlan: continue clinical monitoring and photoprotection
Additional Notes: History of Basal cell carcinoma (BCC), morpheaform type on the left temple status post Mohs surgery performed on 05/14/18 by Dr. Hatfield\\nOn exam today there is no evidence of recurrence.\\nPlan: continue clinical monitoring and photoprotection
Additional Notes: Actinic keratoses, chronic and uncontrolled\\nPatient was educated regarding the pre-cancer skin lesions. Different therapeutic modalities with their risks and benefits were discussed. Patient opted for:\\nTopical treatment with 5-FU cream for AKs on the face and cryotherapy for the treatment of AKs on the right dorsal hand.

## 2023-12-27 ENCOUNTER — APPOINTMENT (RX ONLY)
Dept: URBAN - METROPOLITAN AREA CLINIC 374 | Facility: CLINIC | Age: 68
Setting detail: DERMATOLOGY
End: 2023-12-27

## 2023-12-27 DIAGNOSIS — L57.0 ACTINIC KERATOSIS: ICD-10-CM

## 2023-12-27 DIAGNOSIS — L85.3 XEROSIS CUTIS: ICD-10-CM

## 2023-12-27 PROCEDURE — ? PREMALIGNANT DESTRUCTION

## 2023-12-27 PROCEDURE — ? RECOMMENDATIONS

## 2023-12-27 PROCEDURE — 17000 DESTRUCT PREMALG LESION: CPT

## 2023-12-27 PROCEDURE — 99212 OFFICE O/P EST SF 10 MIN: CPT | Mod: 25

## 2023-12-27 PROCEDURE — 17003 DESTRUCT PREMALG LES 2-14: CPT

## 2023-12-27 PROCEDURE — ? COUNSELING

## 2023-12-27 PROCEDURE — ? PRESCRIPTION MEDICATION MANAGEMENT

## 2023-12-27 PROCEDURE — ? PRESCRIPTION

## 2023-12-27 RX ORDER — EMOLLIENT COMBINATION NO.32
EMULSION, EXTENDED RELEASE TOPICAL QDAY
Qty: 225 | Refills: 3 | Status: ERX | COMMUNITY
Start: 2023-12-27

## 2023-12-27 RX ORDER — FLUOROURACIL 2 G/40G
CREAM TOPICAL QHS
Qty: 40 | Refills: 1 | Status: ERX

## 2023-12-27 RX ADMIN — Medication: at 00:00

## 2023-12-27 ASSESSMENT — LOCATION DETAILED DESCRIPTION DERM
LOCATION DETAILED: SUPERIOR THORACIC SPINE
LOCATION DETAILED: RIGHT SUPERIOR CENTRAL MALAR CHEEK
LOCATION DETAILED: LEFT INFERIOR LATERAL FOREHEAD
LOCATION DETAILED: LEFT FOREHEAD
LOCATION DETAILED: RIGHT CENTRAL EYEBROW
LOCATION DETAILED: LEFT INFERIOR MEDIAL FOREHEAD

## 2023-12-27 ASSESSMENT — LOCATION ZONE DERM
LOCATION ZONE: FACE
LOCATION ZONE: TRUNK

## 2023-12-27 ASSESSMENT — LOCATION SIMPLE DESCRIPTION DERM
LOCATION SIMPLE: LEFT FOREHEAD
LOCATION SIMPLE: RIGHT CHEEK
LOCATION SIMPLE: RIGHT EYEBROW
LOCATION SIMPLE: UPPER BACK

## 2023-12-27 NOTE — PROCEDURE: PRESCRIPTION MEDICATION MANAGEMENT
Detail Level: Zone
Render In Strict Bullet Format?: No
Initiate Treatment: Efudex 5 % topical cream: Apply a thin layer QHS for 2 weeks then discontinue (sending to [id number])
Detail Level: Generalized
Initiate Treatment: EpiCeram topical emulsion, extended release: Apply a thin layer QDAY to dry skin of body

## 2023-12-27 NOTE — PROCEDURE: PREMALIGNANT DESTRUCTION
Post-Care Instructions: I reviewed with the patient in detail post-care instructions. Patient is to wear sunprotection, and avoid picking at any of the treated lesions. Pt may apply Vaseline to crusted or scabbing areas.
Detail Level: Detailed
Anesthesia Volume In Cc: 0
Consent: The patient's consent was obtained including but not limited to risks of crusting, scabbing, blistering, scarring, darker or lighter pigmentary change, recurrence, incomplete removal and infection.
Render Post-Care In The Note: No
Method: liquid nitrogen

## 2023-12-27 NOTE — PROCEDURE: RECOMMENDATIONS
Recommendation Preamble: The following recommendations were made during the visit:
Recommendations (Free Text): Exercising hand to help the nerves.
Detail Level: Zone
Render Risk Assessment In Note?: no

## 2024-04-25 ENCOUNTER — APPOINTMENT (RX ONLY)
Dept: URBAN - METROPOLITAN AREA CLINIC 374 | Facility: CLINIC | Age: 69
Setting detail: DERMATOLOGY
End: 2024-04-25

## 2024-04-25 DIAGNOSIS — D22 MELANOCYTIC NEVI: ICD-10-CM

## 2024-04-25 DIAGNOSIS — D485 NEOPLASM OF UNCERTAIN BEHAVIOR OF SKIN: ICD-10-CM

## 2024-04-25 DIAGNOSIS — L82.1 OTHER SEBORRHEIC KERATOSIS: ICD-10-CM

## 2024-04-25 DIAGNOSIS — D18.0 HEMANGIOMA: ICD-10-CM

## 2024-04-25 DIAGNOSIS — L81.4 OTHER MELANIN HYPERPIGMENTATION: ICD-10-CM

## 2024-04-25 DIAGNOSIS — L56.5 DISSEMINATED SUPERFICIAL ACTINIC POROKERATOSIS (DSAP): ICD-10-CM

## 2024-04-25 DIAGNOSIS — L57.0 ACTINIC KERATOSIS: ICD-10-CM

## 2024-04-25 DIAGNOSIS — Z71.89 OTHER SPECIFIED COUNSELING: ICD-10-CM

## 2024-04-25 PROBLEM — D22.72 MELANOCYTIC NEVI OF LEFT LOWER LIMB, INCLUDING HIP: Status: ACTIVE | Noted: 2024-04-25

## 2024-04-25 PROBLEM — D22.5 MELANOCYTIC NEVI OF TRUNK: Status: ACTIVE | Noted: 2024-04-25

## 2024-04-25 PROBLEM — D22.71 MELANOCYTIC NEVI OF RIGHT LOWER LIMB, INCLUDING HIP: Status: ACTIVE | Noted: 2024-04-25

## 2024-04-25 PROBLEM — D22.61 MELANOCYTIC NEVI OF RIGHT UPPER LIMB, INCLUDING SHOULDER: Status: ACTIVE | Noted: 2024-04-25

## 2024-04-25 PROBLEM — D18.01 HEMANGIOMA OF SKIN AND SUBCUTANEOUS TISSUE: Status: ACTIVE | Noted: 2024-04-25

## 2024-04-25 PROBLEM — D48.5 NEOPLASM OF UNCERTAIN BEHAVIOR OF SKIN: Status: ACTIVE | Noted: 2024-04-25

## 2024-04-25 PROBLEM — D22.62 MELANOCYTIC NEVI OF LEFT UPPER LIMB, INCLUDING SHOULDER: Status: ACTIVE | Noted: 2024-04-25

## 2024-04-25 PROBLEM — D22.39 MELANOCYTIC NEVI OF OTHER PARTS OF FACE: Status: ACTIVE | Noted: 2024-04-25

## 2024-04-25 PROCEDURE — 99213 OFFICE O/P EST LOW 20 MIN: CPT | Mod: 25

## 2024-04-25 PROCEDURE — ? BIOPSY BY SHAVE METHOD

## 2024-04-25 PROCEDURE — 11102 TANGNTL BX SKIN SINGLE LES: CPT

## 2024-04-25 PROCEDURE — 17003 DESTRUCT PREMALG LES 2-14: CPT

## 2024-04-25 PROCEDURE — ? PHOTO-DOCUMENTATION

## 2024-04-25 PROCEDURE — ? PREMALIGNANT DESTRUCTION

## 2024-04-25 PROCEDURE — ? FULL BODY SKIN EXAM

## 2024-04-25 PROCEDURE — ? COUNSELING

## 2024-04-25 PROCEDURE — 17000 DESTRUCT PREMALG LESION: CPT | Mod: 59

## 2024-04-25 PROCEDURE — ? SUNSCREEN RECOMMENDATIONS

## 2024-04-25 ASSESSMENT — LOCATION SIMPLE DESCRIPTION DERM
LOCATION SIMPLE: LEFT FOREHEAD
LOCATION SIMPLE: RIGHT UPPER BACK
LOCATION SIMPLE: RIGHT UPPER ARM
LOCATION SIMPLE: RIGHT FOREARM
LOCATION SIMPLE: CHEST
LOCATION SIMPLE: LEFT SHOULDER
LOCATION SIMPLE: LEFT UPPER BACK
LOCATION SIMPLE: UPPER BACK
LOCATION SIMPLE: RIGHT CHEEK
LOCATION SIMPLE: RIGHT THIGH
LOCATION SIMPLE: LEFT THIGH
LOCATION SIMPLE: LOWER BACK
LOCATION SIMPLE: ABDOMEN
LOCATION SIMPLE: LEFT UPPER ARM
LOCATION SIMPLE: RIGHT SHOULDER

## 2024-04-25 ASSESSMENT — LOCATION DETAILED DESCRIPTION DERM
LOCATION DETAILED: LEFT MEDIAL SUPERIOR CHEST
LOCATION DETAILED: LEFT ANTERIOR PROXIMAL UPPER ARM
LOCATION DETAILED: MIDDLE STERNUM
LOCATION DETAILED: LEFT POSTERIOR SHOULDER
LOCATION DETAILED: RIGHT POSTERIOR SHOULDER
LOCATION DETAILED: RIGHT CENTRAL MALAR CHEEK
LOCATION DETAILED: LEFT ANTERIOR DISTAL THIGH
LOCATION DETAILED: LEFT SUPERIOR MEDIAL UPPER BACK
LOCATION DETAILED: INFERIOR THORACIC SPINE
LOCATION DETAILED: LEFT INFERIOR FOREHEAD
LOCATION DETAILED: RIGHT SUPERIOR UPPER BACK
LOCATION DETAILED: LEFT SUPERIOR FOREHEAD
LOCATION DETAILED: RIGHT SUPERIOR MEDIAL UPPER BACK
LOCATION DETAILED: RIGHT SUPERIOR LATERAL MALAR CHEEK
LOCATION DETAILED: EPIGASTRIC SKIN
LOCATION DETAILED: LEFT SUPERIOR MEDIAL FOREHEAD
LOCATION DETAILED: LEFT ANTERIOR PROXIMAL THIGH
LOCATION DETAILED: RIGHT ANTERIOR PROXIMAL UPPER ARM
LOCATION DETAILED: RIGHT ANTERIOR DISTAL UPPER ARM
LOCATION DETAILED: RIGHT MEDIAL SUPERIOR CHEST
LOCATION DETAILED: RIGHT ANTERIOR DISTAL THIGH
LOCATION DETAILED: RIGHT DISTAL DORSAL FOREARM
LOCATION DETAILED: SUPERIOR LUMBAR SPINE

## 2024-04-25 ASSESSMENT — LOCATION ZONE DERM
LOCATION ZONE: TRUNK
LOCATION ZONE: LEG
LOCATION ZONE: FACE
LOCATION ZONE: ARM

## 2024-04-25 NOTE — PROCEDURE: PREMALIGNANT DESTRUCTION
Render Note In Bullet Format When Appropriate: No
Detail Level: Zone
Consent: The patient's consent was obtained including but not limited to risks of crusting, scabbing, blistering, scarring, darker or lighter pigmentary change, recurrence, incomplete removal and infection.
Anesthesia Volume In Cc: 0.5
Method: liquid nitrogen
Post-Care Instructions: I reviewed with the patient in detail post-care instructions. Patient is to wear sunprotection, and avoid picking at any of the treated lesions. Pt may apply Vaseline to crusted or scabbing areas.

## 2024-04-25 NOTE — PROCEDURE: BIOPSY BY SHAVE METHOD
Detail Level: Detailed
Depth Of Biopsy: dermis
Was A Bandage Applied: Yes
Size Of Lesion In Cm: 0
Biopsy Type: H and E
Biopsy Method: Dermablade
Anesthesia Type: 1% lidocaine with epinephrine
Anesthesia Volume In Cc: 0.3
Hemostasis: Electrocautery and Aluminum Chloride
Wound Care: Petrolatum
Dressing: pressure dressing
Destruction After The Procedure: No
Type Of Destruction Used: Curettage
Curettage Text: The wound bed was treated with curettage after the biopsy was performed.
Cryotherapy Text: The wound bed was treated with cryotherapy after the biopsy was performed.
Electrodesiccation Text: The wound bed was treated with electrodesiccation after the biopsy was performed.
Electrodesiccation And Curettage Text: The wound bed was treated with electrodesiccation and curettage after the biopsy was performed.
Silver Nitrate Text: The wound bed was treated with silver nitrate after the biopsy was performed.
Lab: 6
Consent: Written consent was obtained and risks were reviewed including but not limited to scarring, infection, bleeding, scabbing, incomplete removal, nerve damage and allergy to anesthesia.
Post-Care Instructions: I reviewed with the patient in detail post-care instructions. Patient is to keep the biopsy site dry overnight, and then apply bacitracin twice daily until healed. Patient may apply hydrogen peroxide soaks to remove any crusting.
Notification Instructions: Patient will be notified of biopsy results. However, patient instructed to call the office if not contacted within 2 weeks.
Billing Type: Third-Party Bill
Information: Selecting Yes will display possible errors in your note based on the variables you have selected. This validation is only offered as a suggestion for you. PLEASE NOTE THAT THE VALIDATION TEXT WILL BE REMOVED WHEN YOU FINALIZE YOUR NOTE. IF YOU WANT TO FAX A PRELIMINARY NOTE YOU WILL NEED TO TOGGLE THIS TO 'NO' IF YOU DO NOT WANT IT IN YOUR FAXED NOTE.

## 2024-06-05 ENCOUNTER — APPOINTMENT (RX ONLY)
Dept: URBAN - METROPOLITAN AREA CLINIC 374 | Facility: CLINIC | Age: 69
Setting detail: DERMATOLOGY
End: 2024-06-05

## 2024-06-05 DIAGNOSIS — L57.0 ACTINIC KERATOSIS: ICD-10-CM

## 2024-06-05 DIAGNOSIS — L82.0 INFLAMED SEBORRHEIC KERATOSIS: ICD-10-CM

## 2024-06-05 PROBLEM — D48.5 NEOPLASM OF UNCERTAIN BEHAVIOR OF SKIN: Status: ACTIVE | Noted: 2024-06-05

## 2024-06-05 PROCEDURE — 17000 DESTRUCT PREMALG LESION: CPT | Mod: 59

## 2024-06-05 PROCEDURE — ? PREMALIGNANT DESTRUCTION

## 2024-06-05 PROCEDURE — ? COUNSELING

## 2024-06-05 PROCEDURE — 11102 TANGNTL BX SKIN SINGLE LES: CPT | Mod: 59

## 2024-06-05 PROCEDURE — ? PHOTO-DOCUMENTATION

## 2024-06-05 PROCEDURE — 11103 TANGNTL BX SKIN EA SEP/ADDL: CPT | Mod: 59

## 2024-06-05 PROCEDURE — ? BENIGN DESTRUCTION

## 2024-06-05 PROCEDURE — 17110 DESTRUCTION B9 LES UP TO 14: CPT

## 2024-06-05 PROCEDURE — ? PATHOLOGY DISCUSSION

## 2024-06-05 PROCEDURE — ? BIOPSY BY SHAVE METHOD

## 2024-06-05 ASSESSMENT — LOCATION ZONE DERM
LOCATION ZONE: TRUNK
LOCATION ZONE: LEG
LOCATION ZONE: FACE
LOCATION ZONE: ARM

## 2024-06-05 ASSESSMENT — LOCATION DETAILED DESCRIPTION DERM
LOCATION DETAILED: LEFT SUPERIOR FOREHEAD
LOCATION DETAILED: RIGHT LATERAL SUPERIOR CHEST
LOCATION DETAILED: RIGHT DISTAL POSTERIOR UPPER ARM
LOCATION DETAILED: RIGHT ANTERIOR DISTAL THIGH

## 2024-06-05 ASSESSMENT — LOCATION SIMPLE DESCRIPTION DERM
LOCATION SIMPLE: RIGHT THIGH
LOCATION SIMPLE: LEFT FOREHEAD
LOCATION SIMPLE: RIGHT POSTERIOR UPPER ARM
LOCATION SIMPLE: CHEST

## 2024-06-05 NOTE — PROCEDURE: BENIGN DESTRUCTION
Medical Necessity Information: It is in your best interest to select a reason for this procedure from the list below. All of these items fulfill various CMS LCD requirements except the new and changing color options.
Render Post-Care Instructions In Note?: no
Treatment Number (Will Not Render If 0): 0
Detail Level: Detailed
Post-Care Instructions: I reviewed with the patient in detail post-care instructions. Patient is to wear sunprotection, and avoid picking at any of the treated lesions. Pt may apply Vaseline to crusted or scabbing areas.
Anesthesia Volume In Cc: 0.3
Medical Necessity Clause: This procedure was medically necessary because the lesions that were treated were:
Consent: The patient's consent was obtained including but not limited to risks of crusting, scabbing, blistering, scarring, darker or lighter pigmentary change, recurrence, incomplete removal and infection.

## 2024-06-05 NOTE — PROCEDURE: PREMALIGNANT DESTRUCTION
Method: liquid nitrogen
Consent: The patient's consent was obtained including but not limited to risks of crusting, scabbing, blistering, scarring, darker or lighter pigmentary change, recurrence, incomplete removal and infection.
Render Note In Bullet Format When Appropriate: No
Post-Care Instructions: I reviewed with the patient in detail post-care instructions. Patient is to wear sunprotection, and avoid picking at any of the treated lesions. Pt may apply Vaseline to crusted or scabbing areas.
Detail Level: Zone
Anesthesia Volume In Cc: 0.5

## 2024-06-25 ENCOUNTER — APPOINTMENT (RX ONLY)
Dept: URBAN - METROPOLITAN AREA CLINIC 26 | Facility: CLINIC | Age: 69
Setting detail: DERMATOLOGY
End: 2024-06-25

## 2024-06-25 DIAGNOSIS — L57.0 ACTINIC KERATOSIS: ICD-10-CM

## 2024-06-25 PROBLEM — C44.42 SQUAMOUS CELL CARCINOMA OF SKIN OF SCALP AND NECK: Status: ACTIVE | Noted: 2024-06-25

## 2024-06-25 PROBLEM — C44.329 SQUAMOUS CELL CARCINOMA OF SKIN OF OTHER PARTS OF FACE: Status: ACTIVE | Noted: 2024-06-25

## 2024-06-25 PROBLEM — D48.5 NEOPLASM OF UNCERTAIN BEHAVIOR OF SKIN: Status: ACTIVE | Noted: 2024-06-25

## 2024-06-25 PROCEDURE — ? CONSULTATION FOR MOHS SURGERY

## 2024-06-25 PROCEDURE — ? MOHS SURGERY

## 2024-06-25 PROCEDURE — 12042 INTMD RPR N-HF/GENIT2.6-7.5: CPT | Mod: 59

## 2024-06-25 PROCEDURE — 17311 MOHS 1 STAGE H/N/HF/G: CPT

## 2024-06-25 PROCEDURE — ? BIOPSY BY SHAVE METHOD

## 2024-06-25 PROCEDURE — 99213 OFFICE O/P EST LOW 20 MIN: CPT | Mod: 25

## 2024-06-25 PROCEDURE — 11102 TANGNTL BX SKIN SINGLE LES: CPT | Mod: 59

## 2024-06-25 ASSESSMENT — LOCATION SIMPLE DESCRIPTION DERM: LOCATION SIMPLE: LEFT FOREHEAD

## 2024-06-25 ASSESSMENT — LOCATION DETAILED DESCRIPTION DERM: LOCATION DETAILED: LEFT LATERAL FOREHEAD

## 2024-06-25 ASSESSMENT — LOCATION ZONE DERM: LOCATION ZONE: FACE

## 2024-06-25 NOTE — PROCEDURE: MOHS SURGERY
Mohs Case Number: 
Date Of Previous Biopsy (Optional): 06-05-24
Previous Accession (Optional): [id number]
Biopsy Photograph Reviewed: Yes
Referring Physician (Optional): Venecia Carter PA-C
Consent Type: Consent 1 (Standard)
Eye Shield Used: No
Initial Size Of Lesion: 1.1
X Size Of Lesion In Cm (Optional): 0.7
Number Of Stages: 1
Primary Defect Length In Cm (Final Defect Size - Required For Flaps/Grafts): 2
Primary Defect Width In Cm (Final Defect Size - Required For Flaps/Grafts): 1.4
Primary Defect Depth In Cm (Optional But Required For Some Insurers): 0
Repair Type: Intermediate Layered Repair
Which Instrument Did You Use For Dermabrasion?: Wire Brush
Which Eyelid Repair Cpt Are You Using?: 36499
Oculoplastic Surgeon Procedure Text (A): After obtaining clear surgical margins the patient was sent to oculoplastics for surgical repair.  The patient understands they will receive post-surgical care and follow-up from the referring physician's office.
Otolaryngologist Procedure Text (A): After obtaining clear surgical margins the patient was sent to otolaryngology for surgical repair.  The patient understands they will receive post-surgical care and follow-up from the referring physician's office.
Plastic Surgeon Procedure Text (A): After obtaining clear surgical margins the patient was sent to plastics for surgical repair.  The patient understands they will receive post-surgical care and follow-up from the referring physician's office.
Mid-Level Procedure Text (A): After obtaining clear surgical margins the patient was sent to a mid-level provider for surgical repair.  The patient understands they will receive post-surgical care and follow-up from the mid-level provider.
Provider Procedure Text (A): After obtaining clear surgical margins the defect was repaired by another provider.
Asc Procedure Text (A): After obtaining clear surgical margins the patient was sent to an ASC for surgical repair.  The patient understands they will receive post-surgical care and follow-up from the ASC physician.
Simple / Intermediate / Complex Repair - Final Wound Length In Cm: 5.2
Suturegard Retention Suture: 2-0 Nylon
Retention Suture Bite Size: 3 mm
Length To Time In Minutes Device Was In Place: 10
Undermining Type: Entire Wound
Debridement Text: The wound edges were debrided prior to proceeding with the closure to facilitate wound healing.
Helical Rim Text: The closure involved the helical rim.
Vermilion Border Text: The closure involved the vermilion border.
Nostril Rim Text: The closure involved the nostril rim.
Retention Suture Text: Retention sutures were placed to support the closure and prevent dehiscence.
Location Indication Override (Is Already Calculated Based On Selected Body Location): Area M
Area H Indication Text: Tumors in this location are included in Area H (eyelids, eyebrows, central face, nose, lips, chin, ear, pre-auricular, post-auricular, temple, genitalia, hands, feet, ankles and areola).  Tissue conservation is critical in these anatomic locations.
Area M Indication Text: Tumors in this location are included in Area M (cheek, forehead, scalp, neck, jawline and pretibial skin).  Mohs surgery is indicated for tumors in these anatomic locations.
Area L Indication Text: Tumors in this location are included in Area L (trunk and extremities).  Mohs surgery is indicated for larger tumors, or tumors with aggressive histologic features, in these anatomic locations.
Tumor Debulked?: scalpel
Depth Of Tumor Invasion (For Histology): tumor not visualized (deep and peripheral margins are clear of tumor)
Perineural Invasion (For Histology - Be Specific If Possible): absent
Special Stains Stage 1 - Results: Base On Clearance Noted Above
Stage 2: Additional Anesthesia Type: 1% lidocaine with 1:100,000 epinephrine
Staging Info: By selecting yes to the question above you will include information on AJCC 8 tumor staging in your Mohs note. Information on tumor staging will be automatically added for SCCs on the head and neck. AJCC 8 includes tumor size, tumor depth, perineural involvement and bone invasion.
Tumor Depth: Less than 6mm from granular layer and no invasion beyond the subcutaneous fat
Why Was The Change Made?: Please Select the Appropriate Response
Medical Necessity Statement: Based on my medical judgement, Mohs surgery is the most appropriate treatment for this cancer compared to other treatments.
Alternatives Discussed Intro (Do Not Add Period): I discussed alternative treatments to Mohs surgery and specifically discussed the risks and benefits of
Consent 1/Introductory Paragraph: The rationale for Mohs was explained to the patient and consent was obtained. The risks, benefits and alternatives to therapy were discussed in detail. Specifically, the risks of infection, scarring, bleeding, prolonged wound healing, incomplete removal, allergy to anesthesia, nerve injury and recurrence were addressed. Prior to the procedure, the treatment site was clearly identified and confirmed by the patient using a hand mirror. All components of Universal Protocol/PAUSE Rule completed.
Consent 2/Introductory Paragraph: I explained to the patient that excisional surgery with margin control is the standard treatment for melanoma.  Mohs Surgery, which is a form of excisional surgery with margin control, over the past few decades has become a treatment within that standard of care for melanoma.  Many now consider it the standard of care for melanoma, especially melanoma in situ, on the head and neck given its more complete margin examination, and evidence supporting higher cure rate compared with conventional excision.  In this case, initial margins of 0.6 cm, in excess of the standard margins of 0.5 cm were employed for the first stage of Mohs Surgery.  \\n\\nMohs surgery was explained to the patient and consent was obtained. The risks, benefits and alternatives to therapy were discussed in detail. Specifically, the risks of infection, scarring, bleeding, prolonged wound healing, incomplete removal, allergy to anesthesia, nerve injury and recurrence were addressed. Prior to the procedure, the treatment site was clearly identified and confirmed by the patient  using a hand mirror. All components of Universal Protocol/PAUSE Rule completed.  \\n\\nMART-1 (Melanoma Antigen Recognized by T-cells) antibody immunostaining was used during Mohs surgery as per standard protocol, in addition to routine processing of all specimens with hematoxylin and eosin. One or more of the reagents used in immunohistochemical testing in this case may not have been cleared or approved by the [place]. Food and Drug Administration (FDA). The FDA has determined that such clearance or approval is not necessary. These tests are used for clinical purposes. They should not be regarded as investigational or for research.
Consent 3/Introductory Paragraph: I gave the patient a chance to ask questions they had about the procedure.  Following this I explained the Mohs procedure and consent was obtained. The risks, benefits and alternatives to therapy were discussed in detail. Specifically, the risks of infection, scarring, bleeding, prolonged wound healing, incomplete removal, allergy to anesthesia, nerve injury and recurrence were addressed. Prior to the procedure, the treatment site was clearly identified and confirmed by the patient using a hand mirror. All components of Universal Protocol/PAUSE Rule completed.
Consent (Temporal Branch)/Introductory Paragraph: The rationale for Mohs was explained to the patient and consent was obtained. The risks, benefits and alternatives to therapy were discussed in detail. Specifically, the risks of damage to the temporal branch of the facial nerve, infection, scarring, bleeding, prolonged wound healing, incomplete removal, allergy to anesthesia, and recurrence were addressed. Prior to the procedure, the treatment site was clearly identified and confirmed by the patient using a hand mirror. All components of Universal Protocol/PAUSE Rule completed.
Consent (Marginal Mandibular)/Introductory Paragraph: The rationale for Mohs was explained to the patient and consent was obtained. The risks, benefits and alternatives to therapy were discussed in detail. Specifically, the risks of damage to the marginal mandibular branch of the facial nerve, infection, scarring, bleeding, prolonged wound healing, incomplete removal, allergy to anesthesia, and recurrence were addressed. Prior to the procedure, the treatment site was clearly identified and confirmed by the patient using a hand mirror. All components of Universal Protocol/PAUSE Rule completed.
Consent (Spinal Accessory)/Introductory Paragraph: The rationale for Mohs was explained to the patient and consent was obtained. The risks, benefits and alternatives to therapy were discussed in detail. Specifically, the risks of damage to the spinal accessory nerve, infection, scarring, bleeding, prolonged wound healing, incomplete removal, allergy to anesthesia, and recurrence were addressed. Prior to the procedure, the treatment site was clearly identified and confirmed by the patient using a hand mirror. All components of Universal Protocol/PAUSE Rule completed.
Consent (Near Eyelid Margin)/Introductory Paragraph: The rationale for Mohs was explained to the patient and consent was obtained. The risks, benefits and alternatives to therapy were discussed in detail. Specifically, the risks of ectropion or eyelid deformity, infection, scarring, bleeding, prolonged wound healing, incomplete removal, allergy to anesthesia, nerve injury and recurrence were addressed. Prior to the procedure, the treatment site was clearly identified and confirmed by the patient using a hand mirror. All components of Universal Protocol/PAUSE Rule completed.
Consent (Ear)/Introductory Paragraph: The rationale for Mohs was explained to the patient and consent was obtained. The risks, benefits and alternatives to therapy were discussed in detail. Specifically, the risks of ear deformity, infection, scarring, bleeding, prolonged wound healing, incomplete removal, allergy to anesthesia, nerve injury and recurrence were addressed. Prior to the procedure, the treatment site was clearly identified and confirmed by the patient using a hand mirror. All components of Universal Protocol/PAUSE Rule completed.
Consent (Nose)/Introductory Paragraph: The rationale for Mohs was explained to the patient and consent was obtained. The risks, benefits and alternatives to therapy were discussed in detail. Specifically, the risks of nasal deformity, changes in the flow of air through the nose, infection, scarring, bleeding, prolonged wound healing, incomplete removal, allergy to anesthesia, nerve injury and recurrence were addressed. Prior to the procedure, the treatment site was clearly identified and confirmed by the patient using a hand mirror. All components of Universal Protocol/PAUSE Rule completed.
Consent (Lip)/Introductory Paragraph: The rationale for Mohs was explained to the patient and consent was obtained. The risks, benefits and alternatives to therapy were discussed in detail. Specifically, the risks of lip deformity, changes in the oral aperture, infection, scarring, bleeding, prolonged wound healing, incomplete removal, allergy to anesthesia, nerve injury and recurrence were addressed. Prior to the procedure, the treatment site was clearly identified and confirmed by the patient using a hand mirror. All components of Universal Protocol/PAUSE Rule completed.
Consent (Scalp)/Introductory Paragraph: The rationale for Mohs was explained to the patient and consent was obtained. The risks, benefits and alternatives to therapy were discussed in detail. Specifically, the risks of alopecia in or around the treatment site, changes in hair growth pattern secondary to repair, infection, scarring, bleeding, prolonged wound healing, incomplete removal, allergy to anesthesia, nerve injury and recurrence were addressed. Prior to the procedure, the treatment site was clearly identified and confirmed by the patient using a hand mirror. All components of Universal Protocol/PAUSE Rule completed.
Detail Level: Detailed
Postop Diagnosis: same
Surgeon: Patricio Hatfield M.D.
Anesthesia Type: 1% lidocaine with epinephrine
Hemostasis: Electrodesiccation
Estimated Blood Loss (Cc): minimal
Repair Anesthesia Method: local infiltration
Anesthesia Volume In Cc: 6
Brow Lift Text: A midfrontal incision was made medially to the defect to allow access to the tissues just superior to the left eyebrow. Following careful dissection inferiorly in a supraperiosteal plane to the level of the left eyebrow, several 3-0 monocryl sutures were used to resuspend the eyebrow orbicularis oculi muscular unit to the superior frontal bone periosteum. This resulted in an appropriate reapproximation of static eyebrow symmetry and correction of the left brow ptosis.
Deep Sutures: 5-0 Vicryl
Epidermal Sutures: GluSeal
Suturegard Intro: Intraoperative tissue expansion was performed, utilizing the SUTUREGARD device, in order to reduce wound tension.
Suturegard Body: The suture ends were repeatedly re-tightened and re-clamped to achieve the desired tissue expansion.
Hemigard Intro: Due to skin fragility and wound tension, it was decided to use HEMIGARD adhesive retention suture devices to permit a linear closure. The skin was cleaned and dried for a 6cm distance away from the wound. Excessive hair, if present, was removed to allow for adhesion.
Hemigard Postcare Instructions: The HEMIGARD strips are to remain completely dry for at least 5-7 days.
Donor Site Anesthesia Type: same as repair anesthesia
Graft Basting Suture (Optional): 6-0 Prolene
Epidermal Closure Graft Donor Site (Optional): running
Graft Donor Site Bandage (Optional-Leave Blank If You Don't Want In Note): A pressure bandage with telfa was applied to the donor site.
Closure 2 Information: This tab is for additional flaps and grafts, including complex repair and grafts and complex repair and flaps. You can also specify a different location for the additional defect, if the location is the same you do not need to select a new one. We will insert the automated text for the repair you select below just as we do for solitary flaps and grafts. Please note that at this time if you select a location with a different insurance zone you will need to override the ICD10 and CPT if appropriate.
Closure 3 Information: This tab is for additional flaps and grafts above and beyond our usual structured repairs.  Please note if you enter information here it will not currently bill and you will need to add the billing information manually.
Wound Care: Mastisol
Dressing: steri-strips and pressure dressing
Unna Boot Text: An Unna boot was placed to help immobilize the limb and facilitate more rapid healing.
Home Suture Removal Text: Patient was provided instructions on removing sutures and will remove their sutures at home.  If they have any questions or difficulties they will call the office.
Post-Care Instructions: I reviewed with the patient in detail post-care instructions. Patient is not to engage in any strenuous activity for at least 48 hours, and is to avoid heavy lifting, strenuous exercise, or swimming for the next 14 days. Should the patient develop any fevers, chills, bleeding, or severe pain, the patient will contact the office immediately.
Pain Refusal Text: I offered to prescribe pain medication but the patient refused to take this medication.
Mauc Instructions: By selecting yes to the question below the MAUC number will be added into the note.  This will be calculated automatically based on the diagnosis chosen, the size entered, the body zone selected (H,M,L) and the specific indications you chose. You will also have the option to override the Mohs AUC if you disagree with the automatically calculated number and this option is found in the Case Summary tab.
Where Do You Want The Question To Include Opioid Counseling Located?: Case Summary Tab
Eye Protection Verbiage: Before proceeding with the stage, a plastic scleral shield was inserted. The globe was anesthetized with a few drops of 1% lidocaine with 1:100,000 epinephrine. Then, an appropriate sized scleral shield was chosen and coated with lacrilube ointment. The shield was gently inserted and left in place for the duration of each stage. After the stage was completed, the shield was gently removed.
Mohs Method Verbiage: After tumor debulking, and with each stage, an incision was made into a layer of healthy appearing skin around the clinically apparent tissue following the standard Mohs approach.  The specimen was harvested as a microscopically controlled layer.  A map was prepared to correspond to the area of skin from which it was excised.  The tissue was inked, divided as necessary, and prepared for frozen section processing.
Surgeon/Pathologist Verbiage (Will Incorporate Name Of Surgeon From Intro If Not Blank): operated in two distinct and integrated capacities as the surgeon and pathologist.
Mohs Histo Method Verbiage: Each section was then chromacoded and processed in the Mohs laboratory using the Mohs protocol and submitted for tissue processing with frozen sections prepared in the Mohs laboratory. The entire base and margins of the excised tissue were examined by the surgeon.
Subsequent Stages Histo Method Verbiage: Using a similar technique to that described above, a thin layer of tissue was removed from all areas where tumor was visible on the previous stage.  The tissue was again oriented, mapped, dyed, and processed as above.
Mohs Rapid Report Verbiage: The area of clinically evident tumor was marked with skin marking ink and appropriately hatched.  The initial incision was made following the Mohs approach through the skin.  The specimen was taken to the lab, divided into the necessary number of pieces, chromacoded and processed according to the Mohs protocol.  This was repeated in successive stages until a tumor free defect was achieved.
Complex Repair Preamble Text (Leave Blank If You Do Not Want): The wound edges and deep margins of the Mohs defect were debrided of excess dermis and adipose tissue.  Retention sutures encompassing deep dermal, adipose, and fascial layers were placed to alleviate the tension on the primary suture line and to decrease the potential for wound dehiscence.
Crescentic Complex Repair Preamble Text (Leave Blank If You Do Not Want): Three layered closure, including plication of the fascia underlying the defect, as well as extensive wide undermining were performed.
M-Plasty Complex Repair Preamble Text (Leave Blank If You Do Not Want): Extensive wide undermining was performed.
Crescentic Intermediate Repair Preamble Text (Leave Blank If You Do Not Want): Undermining was performed with blunt dissection.
Non-Graft Cartilage Fenestration Text: The cartilage was fenestrated with a 2mm punch biopsy to help facilitate healing.
Graft Cartilage Fenestration Text: The cartilage was fenestrated with a 2mm punch biopsy to help facilitate graft survival and healing.
Secondary Intention Text (Leave Blank If You Do Not Want): After discussion and consideration of repair and wound management options, the patient opted to allow the defect to heal by second intent.
No Repair - Repaired With Adjacent Surgical Defect Text (Leave Blank If You Do Not Want): After obtaining clear surgical margins the defect was repaired concurrently with another surgical defect which was in close approximation.
Adjacent Tissue Transfer Text: The defect edges were debeveled with a #15 scalpel blade.  Given the location of the defect and the proximity to free margins an adjacent tissue transfer was deemed most appropriate.  Using a sterile surgical marker, an appropriate flap was drawn incorporating the defect and placing the expected incisions within the relaxed skin tension lines where possible.    The area thus outlined was incised deep to adipose tissue with a #15 scalpel blade.  The skin margins were undermined to an appropriate distance in all directions utilizing iris scissors.
Advancement Flap (Single) Text: The defect edges were debeveled with a #15 scalpel blade.  Given the location of the defect and the proximity to free margins a single advancement flap was deemed most appropriate.  Using a sterile surgical marker, an appropriate advancement flap was drawn incorporating the defect and placing the expected incisions within the relaxed skin tension lines where possible.    The area thus outlined was incised deep to adipose tissue with a #15 scalpel blade and carried over to the primary defect.  The skin margins were undermined to an appropriate distance in all directions utilizing iris scissors.
Advancement Flap (Double) Text: The defect edges were debeveled with a #15 scalpel blade.  Given the location of the defect and the proximity to free margins a double advancement flap was deemed most appropriate.  Using a sterile surgical marker, the appropriate advancement flaps were drawn incorporating the defect and placing the expected incisions within the relaxed skin tension lines where possible.    The area thus outlined was incised deep to adipose tissue with a #15 scalpel blade and carried over to the primary defect.  The skin margins were undermined to an appropriate distance in all directions utilizing iris scissors.
Burow's Advancement Flap Text: The defect edges were debeveled with a #15 scalpel blade.  Given the location of the defect and the proximity to free margins a Burow's advancement flap was deemed most appropriate.  Using a sterile surgical marker, the appropriate advancement flap was drawn incorporating the defect and placing the expected incisions within the relaxed skin tension lines where possible.    The area thus outlined was incised deep to adipose tissue with a #15 scalpel blade.  The skin margins were undermined to an appropriate distance in all directions utilizing iris scissors.
Chonodrocutaneous Helical Advancement Flap Text: The defect edges were debeveled with a #15 scalpel blade.  Given the location of the defect and the proximity to free margins a chondrocutaneous helical advancement flap was deemed most appropriate.  Using a sterile surgical marker, the appropriate advancement flap was drawn incorporating the defect and placing the expected incisions within the relaxed skin tension lines where possible.    The area thus outlined was incised deep to adipose tissue with a #15 scalpel blade.  The skin margins were undermined to an appropriate distance in all directions utilizing iris scissors.
Crescentic Advancement Flap Text: The defect edges were debeveled with a #15 scalpel blade.  Given the location of the defect and the proximity to free margins a crescentic advancement flap was deemed most appropriate.  Using a sterile surgical marker, the appropriate advancement flap was drawn incorporating the defect and placing the expected incisions within the relaxed skin tension lines where possible.    The area thus outlined was incised deep to adipose tissue with a #15 scalpel blade.  The skin margins were undermined to an appropriate distance in all directions utilizing iris scissors.
A-T Advancement Flap Text: The defect edges were debeveled with a #15 scalpel blade.  Given the location of the defect, shape of the defect and the proximity to free margins an A-T advancement flap was deemed most appropriate.  Using a sterile surgical marker, an appropriate advancement flap was drawn incorporating the defect and placing the expected incisions within the relaxed skin tension lines where possible.    The area thus outlined was incised deep to adipose tissue with a #15 scalpel blade and carried over to the primary defect.  The skin margins were undermined to an appropriate distance in all directions utilizing iris scissors.
O-T Advancement Flap Text: The defect edges were debeveled with a #15 scalpel blade.  Given the location of the defect, shape of the defect and the proximity to free margins an O-T advancement flap was deemed most appropriate.  Using a sterile surgical marker, an appropriate advancement flap was drawn incorporating the defect and placing the expected incisions within the relaxed skin tension lines where possible.    The area thus outlined was incised deep to adipose tissue with a #15 scalpel blade and carried over to the primary defect.  The skin margins were undermined to an appropriate distance in all directions utilizing iris scissors.
O-L Flap Text: The defect edges were debeveled with a #15 scalpel blade.  Given the location of the defect, shape of the defect and the proximity to free margins an O-L flap was deemed most appropriate.  Using a sterile surgical marker, an appropriate advancement flap was drawn incorporating the defect and placing the expected incisions within the relaxed skin tension lines where possible.    The area thus outlined was incised deep to adipose tissue with a #15 scalpel blade.  The skin margins were undermined to an appropriate distance in all directions utilizing iris scissors.
O-Z Flap Text: The defect edges were debeveled with a #15 scalpel blade.  Given the location of the defect, shape of the defect and the proximity to free margins an O-Z flap was deemed most appropriate.  Using a sterile surgical marker, an appropriate transposition flap was drawn incorporating the defect and placing the expected incisions within the relaxed skin tension lines where possible. The area thus outlined was incised deep to adipose tissue with a #15 scalpel blade and carried over to the primary defect.  The skin margins were undermined to an appropriate distance in all directions utilizing iris scissors.
Double O-Z Flap Text: The defect edges were debeveled with a #15 scalpel blade.  Given the location of the defect, shape of the defect and the proximity to free margins a Double O-Z flap was deemed most appropriate.  Using a sterile surgical marker, an appropriate transposition flap was drawn incorporating the defect and placing the expected incisions within the relaxed skin tension lines where possible. The area thus outlined was incised deep to adipose tissue with a #15 scalpel blade.  The skin margins were undermined to an appropriate distance in all directions utilizing iris scissors.
V-Y Flap Text: The defect edges were debeveled with a #15 scalpel blade.  Given the location of the defect, shape of the defect and the proximity to free margins a V-Y flap was deemed most appropriate.  Using a sterile surgical marker, an appropriate advancement flap was drawn incorporating the defect and placing the expected incisions within the relaxed skin tension lines where possible.    The area thus outlined was incised deep to adipose tissue with a #15 scalpel blade.  The skin margins were undermined to an appropriate distance in all directions utilizing iris scissors.
Advancement-Rotation Flap Text: The defect edges were debeveled with a #15 scalpel blade.  Given the location of the defect, shape of the defect and the proximity to free margins an advancement-rotation flap was deemed most appropriate.  Using a sterile surgical marker, an appropriate flap was drawn incorporating the defect and placing the expected incisions within the relaxed skin tension lines where possible. The area thus outlined was incised deep to adipose tissue with a #15 scalpel blade.  The skin margins were undermined to an appropriate distance in all directions utilizing iris scissors.
Mercedes Flap Text: The defect edges were debeveled with a #15 scalpel blade.  Given the location of the defect, shape of the defect and the proximity to free margins a Mercedes flap was deemed most appropriate.  Using a sterile surgical marker, an appropriate advancement flap was drawn incorporating the defect and placing the expected incisions within the relaxed skin tension lines where possible. The area thus outlined was incised deep to adipose tissue with a #15 scalpel blade and carried over to the primary defect.  The skin margins were undermined to an appropriate distance in all directions utilizing iris scissors.
Modified Advancement Flap Text: The defect edges were debeveled with a #15 scalpel blade.  Given the location of the defect, shape of the defect and the proximity to free margins a three point advancement flap was deemed most appropriate.  Using a sterile surgical marker, an appropriate advancement flap was drawn incorporating the defect and placing the expected incisions within the relaxed skin tension lines where possible.   The area thus outlined was incised deep to adipose tissue with a #15 scalpel blade.  Three standing tissue cones were excised.  The skin margins were undermined to an appropriate distance in all directions utilizing iris scissors.
Mucosal Advancement Flap Text: Given the location of the defect, shape of the defect and the proximity to free margins a mucosal advancement flap was deemed most appropriate. Incisions were made with a 15 blade scalpel in the appropriate fashion along the cutaneous vermilion border and the mucosal lip. The remaining actinically damaged mucosal tissue was excised.  The mucosal advancement flap was then elevated to the gingival sulcus with care taken to preserve the neurovascular structures and advanced into the primary defect. Care was taken to ensure that precise realignment of the vermilion border was achieved.
Peng Advancement Flap Text: The defect edges were debeveled with a #15 scalpel blade.  Given the location of the defect, shape of the defect and the proximity to free margins a Peng advancement flap was deemed most appropriate.  Using a sterile surgical marker, an appropriate advancement flap was drawn incorporating the defect and placing the expected incisions within the relaxed skin tension lines where possible. The area thus outlined was incised deep to adipose tissue with a #15 scalpel blade and carried over to the primary defect.  The skin margins were undermined to an appropriate distance in all directions utilizing iris scissors.
Hatchet Flap Text: The defect edges were debeveled with a #15 scalpel blade.  Given the location of the defect, shape of the defect and the proximity to free margins a hatchet flap was deemed most appropriate.  Using a sterile surgical marker, an appropriate hatchet flap was drawn incorporating the defect and placing the expected incisions within the relaxed skin tension lines where possible.    The area thus outlined was incised deep to adipose tissue with a #15 scalpel blade and carried over to the primary defect.  The skin margins were undermined to an appropriate distance in all directions utilizing iris scissors.
Rotation Flap Text: The defect edges were debeveled with a #15 scalpel blade.  Given the location of the defect, shape of the defect and the proximity to free margins a rotation flap was deemed most appropriate.  Using a sterile surgical marker, an appropriate rotation flap was drawn incorporating the defect and placing the expected incisions within the relaxed skin tension lines where possible.    The area thus outlined was incised deep to adipose tissue with a #15 scalpel blade and carried over to the primary defect.  The skin margins were undermined to an appropriate distance in all directions utilizing iris scissors.
Bilateral Rotation Flap Text: The defect edges were debeveled with a #15 scalpel blade. Given the location of the defect, shape of the defect and the proximity to free margins a bilateral rotation flap was deemed most appropriate. Using a sterile surgical marker, an appropriate rotation flap was drawn incorporating the defect and placing the expected incisions within the relaxed skin tension lines where possible. The area thus outlined was incised deep to adipose tissue with a #15 scalpel blade. The skin margins were undermined to an appropriate distance in all directions utilizing iris scissors. Following this, the designed flap was carried over into the primary defect and sutured into place.
Spiral Flap Text: The defect edges were debeveled with a #15 scalpel blade.  Given the location of the defect, shape of the defect and the proximity to free margins a spiral flap was deemed most appropriate.  Using a sterile surgical marker, an appropriate rotation flap was drawn incorporating the defect and placing the expected incisions within the relaxed skin tension lines where possible. The area thus outlined was incised deep to adipose tissue with a #15 scalpel blade and carried over to the primary defect.  The skin margins were undermined to an appropriate distance in all directions utilizing iris scissors.
Staged Advancement Flap Text: The defect edges were debeveled with a #15 scalpel blade.  Given the location of the defect, shape of the defect and the proximity to free margins a staged advancement flap was deemed most appropriate.  Using a sterile surgical marker, an appropriate advancement flap was drawn incorporating the defect and placing the expected incisions within the relaxed skin tension lines where possible. The area thus outlined was incised deep to adipose tissue with a #15 scalpel blade.  The skin margins were undermined to an appropriate distance in all directions utilizing iris scissors.
Star Wedge Flap Text: The defect edges were debeveled with a #15 scalpel blade.  Given the location of the defect, shape of the defect and the proximity to free margins a star wedge flap was deemed most appropriate.  Using a sterile surgical marker, an appropriate rotation flap was drawn incorporating the defect and placing the expected incisions within the relaxed skin tension lines where possible. The area thus outlined was incised deep to adipose tissue with a #15 scalpel blade.  The skin margins were undermined to an appropriate distance in all directions utilizing iris scissors.
Transposition Flap Text: The defect edges were debeveled with a #15 scalpel blade.  Given the location of the defect and the proximity to free margins a transposition flap was deemed most appropriate.  Using a sterile surgical marker, an appropriate transposition flap was drawn incorporating the defect.    The area thus outlined was incised deep to adipose tissue with a #15 scalpel blade and carried over to the primary defect.  The skin margins were undermined to an appropriate distance in all directions utilizing iris scissors.
Muscle Hinge Flap Text: The defect edges were debeveled with a #15 scalpel blade.  Given the size, depth and location of the defect and the proximity to free margins a muscle hinge flap was deemed most appropriate.  Using a sterile surgical marker, an appropriate hinge flap was drawn incorporating the defect. The area thus outlined was incised with a #15 scalpel blade.  The skin margins were undermined to an appropriate distance in all directions utilizing iris scissors.
Mustarde Flap Text: The defect edges were debeveled with a #15 scalpel blade.  Given the size, depth and location of the defect and the proximity to free margins a Mustarde flap was deemed most appropriate.  Using a sterile surgical marker, an appropriate flap was drawn incorporating the defect. The area thus outlined was incised with a #15 scalpel blade and carried over to the primary defect.  The skin margins were undermined to an appropriate distance in all directions utilizing iris scissors.
Nasal Turnover Hinge Flap Text: The defect edges were debeveled with a #15 scalpel blade.  Given the size, depth, location of the defect and the defect being full thickness a nasal turnover hinge flap was deemed most appropriate.  Using a sterile surgical marker, an appropriate hinge flap was drawn incorporating the defect. The area thus outlined was incised with a #15 scalpel blade and carried over to the primary defect. The flap was designed to recreate the nasal mucosal lining and the alar rim. The skin margins were undermined to an appropriate distance in all directions utilizing iris scissors.
Nasalis-Muscle-Based Myocutaneous Island Pedicle Flap Text: Using a #15 blade, an incision was made around the donor flap to the level of the nasalis muscle. Wide lateral undermining was then performed in both the subcutaneous plane above the nasalis muscle, and in a submuscular plane just above periosteum. This allowed the formation of a free nasalis muscle axial pedicle (based on the angular artery) which was still attached to the actual cutaneous flap, increasing its mobility and vascular viability. Hemostasis was obtained with pinpoint electrocoagulation. The flap was mobilized into position and the pivotal anchor points positioned and stabilized with buried interrupted sutures. Subcutaneous and dermal tissues were closed in a multilayered fashion with sutures. Tissue redundancies were excised, and the epidermal edges were apposed without significant tension and sutured with sutures.
Nasalis Myocutaneous Flap Text: Using a #15 blade, an incision was made around the donor flap to the level of the nasalis muscle. Wide lateral undermining was then performed in both the subcutaneous plane above the nasalis muscle, and in a submuscular plane just above periosteum. This allowed the formation of a free nasalis muscle axial pedicle which was still attached to the actual cutaneous flap, increasing its mobility and vascular viability. Hemostasis was obtained with pinpoint electrocoagulation. The flap was mobilized into position and the pivotal anchor points positioned and stabilized with buried interrupted sutures. Subcutaneous and dermal tissues were closed in a multilayered fashion with sutures. Tissue redundancies were excised, and the epidermal edges were apposed without significant tension and sutured with sutures.
Nasolabial Transposition Flap Text: The defect edges were debeveled with a #15 scalpel blade.  Given the size, depth and location of the defect and the proximity to free margins a nasolabial transposition flap was deemed most appropriate. Using a sterile surgical marker, an appropriate flap was drawn incorporating the defect. The area thus outlined was incised with a #15 scalpel blade. The skin margins were undermined to an appropriate distance in all directions utilizing iris scissors. Following this, the designed flap was carried into the primary defect and sutured into place.
Orbicularis Oris Muscle Flap Text: The defect edges were debeveled with a #15 scalpel blade.  Given that the defect affected the competency of the oral sphincter an orbicularis oris muscle flap was deemed most appropriate to restore this competency and normal muscle function.  Using a sterile surgical marker, an appropriate flap was drawn incorporating the defect. The area thus outlined was incised with a #15 scalpel blade.
Melolabial Transposition Flap Text: The defect edges were debeveled with a #15 scalpel blade.  Given the location of the defect and the proximity to free margins a melolabial flap was deemed most appropriate.  Using a sterile surgical marker, an appropriate melolabial transposition flap was drawn incorporating the defect.    The area thus outlined was incised deep to adipose tissue with a #15 scalpel blade and carried over to the primary defect.  The skin margins were undermined to an appropriate distance in all directions utilizing iris scissors.
Rectangular Flap Text: The defect edges were debeveled with a #15 scalpel blade. Given the location of the defect and the proximity to free margins a rectangular flap was deemed most appropriate. Using a sterile surgical marker, an appropriate rectangular flap was drawn incorporating the defect. The area thus outlined was incised deep to adipose tissue with a #15 scalpel blade. The skin margins were undermined to an appropriate distance in all directions utilizing iris scissors. Following this, the designed flap was carried over into the primary defect and sutured into place.
Rhombic Flap Text: The defect edges were debeveled with a #15 scalpel blade.  Given the location of the defect and the proximity to free margins a rhombic flap was deemed most appropriate.  Using a sterile surgical marker, an appropriate rhombic flap was drawn incorporating the defect.    The area thus outlined was incised deep to adipose tissue with a #15 scalpel blade and carried over to the primary defect.  The skin margins were undermined to an appropriate distance in all directions utilizing iris scissors.
Rhomboid Transposition Flap Text: The defect edges were debeveled with a #15 scalpel blade.  Given the location of the defect and the proximity to free margins a rhomboid transposition flap was deemed most appropriate.  Using a sterile surgical marker, an appropriate rhomboid flap was drawn incorporating the defect.    The area thus outlined was incised deep to adipose tissue with a #15 scalpel blade.  The skin margins were undermined to an appropriate distance in all directions utilizing iris scissors.
Bi-Rhombic Flap Text: The defect edges were debeveled with a #15 scalpel blade.  Given the location of the defect and the proximity to free margins a bi-rhombic flap was deemed most appropriate.  Using a sterile surgical marker, an appropriate rhombic flap was drawn incorporating the defect. The area thus outlined was incised deep to adipose tissue with a #15 scalpel blade.  The skin margins were undermined to an appropriate distance in all directions utilizing iris scissors.
Helical Rim Advancement Flap Text: The defect edges were debeveled with a #15 blade scalpel.  Given the location of the defect and the proximity to free margins (helical rim) a double helical rim advancement flap was deemed most appropriate.  Using a sterile surgical marker, the appropriate advancement flaps were drawn incorporating the defect and placing the expected incisions between the helical rim and antihelix where possible.  The area thus outlined was incised through and through with a #15 scalpel blade and carried over to the primary defect.  With a skin hook and iris scissors, the flaps were gently and sharply undermined and freed up.
Bilateral Helical Rim Advancement Flap Text: The defect edges were debeveled with a #15 blade scalpel.  Given the location of the defect and the proximity to free margins (helical rim) a bilateral helical rim advancement flap was deemed most appropriate.  Using a sterile surgical marker, the appropriate advancement flaps were drawn incorporating the defect and placing the expected incisions between the helical rim and antihelix where possible.  The area thus outlined was incised through and through with a #15 scalpel blade.  With a skin hook and iris scissors, the flaps were gently and sharply undermined and freed up.
Ear Star Wedge Flap Text: The defect edges were debeveled with a #15 blade scalpel.  Given the location of the defect and the proximity to free margins (helical rim) an ear star wedge flap was deemed most appropriate.  Using a sterile surgical marker, the appropriate flap was drawn incorporating the defect and placing the expected incisions between the helical rim and antihelix where possible.  The area thus outlined was incised through and through with a #15 scalpel blade.
Banner Transposition Flap Text: The defect edges were debeveled with a #15 scalpel blade.  Given the location of the defect and the proximity to free margins a Banner transposition flap was deemed most appropriate.  Using a sterile surgical marker, an appropriate flap drawn around the defect. The area thus outlined was incised deep to adipose tissue with a #15 scalpel blade.  The skin margins were undermined to an appropriate distance in all directions utilizing iris scissors.
Bilobed Flap Text: The defect edges were debeveled with a #15 scalpel blade.  Given the location of the defect and the proximity to free margins a bilobe flap was deemed most appropriate.  Using a sterile surgical marker, an appropriate bilobed flap drawn around the defect.    The area thus outlined was incised deep to adipose tissue with a #15 scalpel blade and carried over to the primary defect.  The skin margins were undermined to an appropriate distance in all directions utilizing iris scissors.
Bilobed Transposition Flap Text: The defect edges were debeveled with a #15 scalpel blade.  Given the location of the defect and the proximity to free margins a bilobed transposition flap was deemed most appropriate.  Using a sterile surgical marker, an appropriate bilobe flap drawn around the defect.    The area thus outlined was incised deep to adipose tissue with a #15 scalpel blade and carried over to the primary defect.  The skin margins were undermined to an appropriate distance in all directions utilizing iris scissors.
Trilobed Flap Text: The defect edges were debeveled with a #15 scalpel blade.  Given the location of the defect and the proximity to free margins a trilobed flap was deemed most appropriate.  Using a sterile surgical marker, an appropriate trilobed flap drawn around the defect.    The area thus outlined was incised deep to adipose tissue with a #15 scalpel blade and carried over to the primary defect.  The skin margins were undermined to an appropriate distance in all directions utilizing iris scissors.
Dorsal Nasal Flap Text: The defect edges were debeveled with a #15 scalpel blade.  Given the location of the defect and the proximity to free margins a dorsal nasal flap was deemed most appropriate.  Using a sterile surgical marker, an appropriate dorsal nasal flap was drawn around the defect.    The area thus outlined was incised deep to adipose tissue with a #15 scalpel blade and carried over to the primary defect.  The skin margins were undermined to an appropriate distance in all directions utilizing iris scissors.
Island Pedicle Flap Text: The defect edges were debeveled with a #15 scalpel blade.  Given the location of the defect, shape of the defect and the proximity to free margins an island pedicle advancement flap was deemed most appropriate.  Using a sterile surgical marker, an appropriate advancement flap was drawn incorporating the defect, outlining the appropriate donor tissue and placing the expected incisions within the relaxed skin tension lines where possible.    The area thus outlined was incised deep to adipose tissue with a #15 scalpel blade and carried over to the primary defect.  The skin margins were undermined to an appropriate distance in all directions around the primary defect and laterally outward around the island pedicle utilizing iris scissors.  There was minimal undermining beneath the pedicle flap.
Island Pedicle Flap With Canthal Suspension Text: The defect edges were debeveled with a #15 scalpel blade.  Given the location of the defect, shape of the defect and the proximity to free margins an island pedicle advancement flap was deemed most appropriate.  Using a sterile surgical marker, an appropriate advancement flap was drawn incorporating the defect, outlining the appropriate donor tissue and placing the expected incisions within the relaxed skin tension lines where possible. The area thus outlined was incised deep to adipose tissue with a #15 scalpel blade.  The skin margins were undermined to an appropriate distance in all directions around the primary defect and laterally outward around the island pedicle utilizing iris scissors.  There was minimal undermining beneath the pedicle flap. A suspension suture was placed in the canthal tendon to prevent tension and prevent ectropion.
Alar Island Pedicle Flap Text: The defect edges were debeveled with a #15 scalpel blade.  Given the location of the defect, shape of the defect and the proximity to the alar rim an island pedicle advancement flap was deemed most appropriate.  Using a sterile surgical marker, an appropriate advancement flap was drawn incorporating the defect, outlining the appropriate donor tissue and placing the expected incisions within the nasal ala running parallel to the alar rim. The area thus outlined was incised with a #15 scalpel blade and carried over to the primary defect.  The skin margins were undermined minimally to an appropriate distance in all directions around the primary defect and laterally outward around the island pedicle utilizing iris scissors.  There was minimal undermining beneath the pedicle flap.
Double Island Pedicle Flap Text: The defect edges were debeveled with a #15 scalpel blade.  Given the location of the defect, shape of the defect and the proximity to free margins a double island pedicle advancement flap was deemed most appropriate.  Using a sterile surgical marker, an appropriate advancement flap was drawn incorporating the defect, outlining the appropriate donor tissue and placing the expected incisions within the relaxed skin tension lines where possible.    The area thus outlined was incised deep to adipose tissue with a #15 scalpel blade and carried over to the primary defect.  The skin margins were undermined to an appropriate distance in all directions around the primary defect and laterally outward around the island pedicle utilizing iris scissors.  There was minimal undermining beneath the pedicle flap.
Island Pedicle Flap-Requiring Vessel Identification Text: The defect edges were debeveled with a #15 scalpel blade.  Given the location of the defect, shape of the defect and the proximity to free margins an island pedicle advancement flap was deemed most appropriate.  Using a sterile surgical marker, an appropriate advancement flap was drawn, based on the axial vessel mentioned above, incorporating the defect, outlining the appropriate donor tissue and placing the expected incisions within the relaxed skin tension lines where possible.    The area thus outlined was incised deep to adipose tissue with a #15 scalpel blade.  The skin margins were undermined to an appropriate distance in all directions around the primary defect and laterally outward around the island pedicle utilizing iris scissors.  There was minimal undermining beneath the pedicle flap.
Keystone Flap Text: The defect edges were debeveled with a #15 scalpel blade.  Given the location of the defect, shape of the defect a keystone flap was deemed most appropriate.  Using a sterile surgical marker, an appropriate keystone flap was drawn incorporating the defect, outlining the appropriate donor tissue and placing the expected incisions within the relaxed skin tension lines where possible. The area thus outlined was incised deep to adipose tissue with a #15 scalpel blade and carried over to the primary defect.  The skin margins were undermined to an appropriate distance in all directions around the primary defect and laterally outward around the flap utilizing iris scissors.
O-T Plasty Text: The defect edges were debeveled with a #15 scalpel blade.  Given the location of the defect, shape of the defect and the proximity to free margins an O-T plasty was deemed most appropriate.  Using a sterile surgical marker, an appropriate O-T plasty was drawn incorporating the defect and placing the expected incisions within the relaxed skin tension lines where possible.    The area thus outlined was incised deep to adipose tissue with a #15 scalpel blade.  The skin margins were undermined to an appropriate distance in all directions utilizing iris scissors.
O-Z Plasty Text: The defect edges were debeveled with a #15 scalpel blade.  Given the location of the defect, shape of the defect and the proximity to free margins an O-Z plasty (double transposition flap) was deemed most appropriate.  Using a sterile surgical marker, the appropriate transposition flaps were drawn incorporating the defect and placing the expected incisions within the relaxed skin tension lines where possible.    The area thus outlined was incised deep to adipose tissue with a #15 scalpel blade.  The skin margins were undermined to an appropriate distance in all directions utilizing iris scissors.  Hemostasis was achieved with electrocautery.  The flaps were then transposed into place, one clockwise and the other counterclockwise, and anchored with interrupted buried subcutaneous sutures.
Double O-Z Plasty Text: The defect edges were debeveled with a #15 scalpel blade.  Given the location of the defect, shape of the defect and the proximity to free margins a Double O-Z plasty (double transposition flap) was deemed most appropriate.  Using a sterile surgical marker, the appropriate transposition flaps were drawn incorporating the defect and placing the expected incisions within the relaxed skin tension lines where possible. The area thus outlined was incised deep to adipose tissue with a #15 scalpel blade.  The skin margins were undermined to an appropriate distance in all directions utilizing iris scissors.  Hemostasis was achieved with electrocautery.  The flaps were then transposed into place, one clockwise and the other counterclockwise, and anchored with interrupted buried subcutaneous sutures.
V-Y Plasty Text: The defect edges were debeveled with a #15 scalpel blade.  Given the location of the defect, shape of the defect and the proximity to free margins an V-Y advancement flap was deemed most appropriate.  Using a sterile surgical marker, an appropriate advancement flap was drawn incorporating the defect and placing the expected incisions within the relaxed skin tension lines where possible.    The area thus outlined was incised deep to adipose tissue with a #15 scalpel blade.  The skin margins were undermined to an appropriate distance in all directions utilizing iris scissors.
H Plasty Text: Given the location of the defect, shape of the defect and the proximity to free margins a H-plasty was deemed most appropriate for repair.  Using a sterile surgical marker, the appropriate advancement arms of the H-plasty were drawn incorporating the defect and placing the expected incisions within the relaxed skin tension lines where possible. The area thus outlined was incised deep to adipose tissue with a #15 scalpel blade. The skin margins were undermined to an appropriate distance in all directions utilizing iris scissors.  The opposing advancement arms were then advanced into place in opposite direction and anchored with interrupted buried subcutaneous sutures.
W Plasty Text: The lesion was extirpated to the level of the fat with a #15 scalpel blade.  Given the location of the defect, shape of the defect and the proximity to free margins a W-plasty was deemed most appropriate for repair.  Using a sterile surgical marker, the appropriate transposition arms of the W-plasty were drawn incorporating the defect and placing the expected incisions within the relaxed skin tension lines where possible.    The area thus outlined was incised deep to adipose tissue with a #15 scalpel blade.  The skin margins were undermined to an appropriate distance in all directions utilizing iris scissors.  The opposing transposition arms were then transposed into place in opposite direction and anchored with interrupted buried subcutaneous sutures.
Z Plasty Text: The lesion was extirpated to the level of the fat with a #15 scalpel blade.  Given the location of the defect, shape of the defect and the proximity to free margins a Z-plasty was deemed most appropriate for repair.  Using a sterile surgical marker, the appropriate transposition arms of the Z-plasty were drawn incorporating the defect and placing the expected incisions within the relaxed skin tension lines where possible.    The area thus outlined was incised deep to adipose tissue with a #15 scalpel blade.  The skin margins were undermined to an appropriate distance in all directions utilizing iris scissors.  The opposing transposition arms were then transposed into place in opposite direction and anchored with interrupted buried subcutaneous sutures.
Double Z Plasty Text: The lesion was extirpated to the level of the fat with a #15 scalpel blade. Given the location of the defect, shape of the defect and the proximity to free margins a double Z-plasty was deemed most appropriate for repair. Using a sterile surgical marker, the appropriate transposition arms of the double Z-plasty were drawn incorporating the defect and placing the expected incisions within the relaxed skin tension lines where possible. The area thus outlined was incised deep to adipose tissue with a #15 scalpel blade. The skin margins were undermined to an appropriate distance in all directions utilizing iris scissors. The opposing transposition arms were then transposed and carried over into place in opposite direction and anchored with interrupted buried subcutaneous sutures.
Zygomaticofacial Flap Text: Given the location of the defect, shape of the defect and the proximity to free margins a zygomaticofacial flap was deemed most appropriate for repair.  Using a sterile surgical marker, the appropriate flap was drawn incorporating the defect and placing the expected incisions within the relaxed skin tension lines where possible. The area thus outlined was incised deep to adipose tissue with a #15 scalpel blade with preservation of a vascular pedicle.  The skin margins were undermined to an appropriate distance in all directions utilizing iris scissors.  The flap was then placed into the defect and anchored with interrupted buried subcutaneous sutures.
Cheek Interpolation Flap Text: A decision was made to reconstruct the defect utilizing an interpolation axial flap and a staged reconstruction.  A telfa template was made of the defect.  This telfa template was then used to outline the Cheek Interpolation flap.  The donor area for the pedicle flap was then injected with anesthesia.  The flap was excised through the skin and subcutaneous tissue down to the layer of the underlying musculature.  The interpolation flap was carefully excised within this deep plane to maintain its blood supply.  The edges of the donor site were undermined.   The donor site was closed in a primary fashion.  The pedicle was then carried over to the primary defect, rotated into position and sutured.  Once the tube was sutured into place, adequate blood supply was confirmed with blanching and refill.  The pedicle was then wrapped with xeroform gauze and dressed appropriately with a telfa and gauze bandage to ensure continued blood supply and protect the attached pedicle.
Cheek-To-Nose Interpolation Flap Text: A decision was made to reconstruct the defect utilizing an interpolation axial flap and a staged reconstruction.  A telfa template was made of the defect.  This telfa template was then used to outline the Cheek-To-Nose Interpolation flap.  The donor area for the pedicle flap was then injected with anesthesia.  The flap was excised through the skin and subcutaneous tissue down to the layer of the underlying musculature.  The interpolation flap was carefully excised within this deep plane to maintain its blood supply.  The edges of the donor site were undermined.   The donor site was closed in a primary fashion.  The pedicle was then and carried over to the primary defect, rotated into position and sutured.  Once the tube was sutured into place, adequate blood supply was confirmed with blanching and refill.  The pedicle was then wrapped with xeroform gauze and dressed appropriately with a telfa and gauze bandage to ensure continued blood supply and protect the attached pedicle.
Interpolation Flap Text: A decision was made to reconstruct the defect utilizing an interpolation axial flap and a staged reconstruction.  A telfa template was made of the defect.  This telfa template was then used to outline the interpolation flap.  The donor area for the pedicle flap was then injected with anesthesia.  The flap was excised through the skin and subcutaneous tissue down to the layer of the underlying musculature.  The interpolation flap was carefully excised within this deep plane to maintain its blood supply.  The edges of the donor site were undermined.   The donor site was closed in a primary fashion.  The pedicle was then and carried over to the primary defect, rotated into position and sutured.  Once the tube was sutured into place, adequate blood supply was confirmed with blanching and refill.  The pedicle was then wrapped with xeroform gauze and dressed appropriately with a telfa and gauze bandage to ensure continued blood supply and protect the attached pedicle.
Melolabial Interpolation Flap Text: A decision was made to reconstruct the defect utilizing an interpolation axial flap and a staged reconstruction.  A telfa template was made of the defect.  This telfa template was then used to outline the melolabial interpolation flap.  The donor area for the pedicle flap was then injected with anesthesia.  The flap was excised through the skin and subcutaneous tissue down to the layer of the underlying musculature.  The pedicle flap was carefully excised within this deep plane to maintain its blood supply.  The edges of the donor site were undermined.   The donor site was closed in a primary fashion.  The pedicle was then carried over to the primary defect, rotated into position and sutured.  Once the tube was sutured into place, adequate blood supply was confirmed with blanching and refill.  The pedicle was then wrapped with xeroform gauze and dressed appropriately with a telfa and gauze bandage to ensure continued blood supply and protect the attached pedicle.
Mastoid Interpolation Flap Text: A decision was made to reconstruct the defect utilizing an interpolation axial flap and a staged reconstruction.  A telfa template was made of the defect.  This telfa template was then used to outline the mastoid interpolation flap.  The donor area for the pedicle flap was then injected with anesthesia.  The flap was excised through the skin and subcutaneous tissue down to the layer of the underlying musculature.  The pedicle flap was carefully excised within this deep plane to maintain its blood supply.  The edges of the donor site were undermined.   The donor site was closed in a primary fashion.  The pedicle was then and carried over to the primary defect, rotated into position and sutured.  Once the tube was sutured into place, adequate blood supply was confirmed with blanching and refill.  The pedicle was then wrapped with xeroform gauze and dressed appropriately with a telfa and gauze bandage to ensure continued blood supply and protect the attached pedicle.
Posterior Auricular Interpolation Flap Text: A decision was made to reconstruct the defect utilizing an interpolation axial flap and a staged reconstruction.  A telfa template was made of the defect.  This telfa template was then used to outline the posterior auricular interpolation flap.  The donor area for the pedicle flap was then injected with anesthesia.  The flap was excised through the skin and subcutaneous tissue down to the layer of the underlying musculature.  The pedicle flap was carefully excised within this deep plane to maintain its blood supply.  The edges of the donor site were undermined.   The donor site was closed in a primary fashion.  The pedicle was then rotated into position and sutured.  Once the tube was sutured into place, adequate blood supply was confirmed with blanching and refill.  The pedicle was then wrapped with xeroform gauze and dressed appropriately with a telfa and gauze bandage to ensure continued blood supply and protect the attached pedicle.
Paramedian Forehead Flap Text: A decision was made to reconstruct the defect utilizing an interpolation axial flap and a staged reconstruction.  A telfa template was made of the defect.  This telfa template was then used to outline the paramedian forehead pedicle flap.  The donor area for the pedicle flap was then injected with anesthesia.  The flap was excised through the skin and subcutaneous tissue down to the layer of the underlying musculature.  The pedicle flap was carefully excised within this deep plane to maintain its blood supply.  The edges of the donor site were undermined.   The donor site was closed in a primary fashion.  The pedicle was then rotated into position and sutured.  Once the tube was sutured into place, adequate blood supply was confirmed with blanching and refill.  The pedicle was then wrapped with xeroform gauze and dressed appropriately with a telfa and gauze bandage to ensure continued blood supply and protect the attached pedicle.
Abbe Flap (Upper To Lower Lip) Text: The defect of the lower lip was assessed and measured.  Given the location and size of the defect, an Abbe flap was deemed most appropriate.  Using a sterile surgical marker, an appropriate Abbe flap was measured and drawn on the upper lip. Local anesthesia was then infiltrated.  A scalpel was then used to incise the upper lip through and through the skin, vermilion, muscle and mucosa, leaving the flap pedicled on the opposite side.  The flap was then rotated and transferred to the lower lip defect.  The flap was then sutured into place with a three layer technique, closing the orbicularis oris muscle layer with subcutaneous buried sutures, followed by a mucosal layer and an epidermal layer.
Abbe Flap (Lower To Upper Lip) Text: The defect of the upper lip was assessed and measured.  Given the location and size of the defect, an Abbe flap was deemed most appropriate.  Using a sterile surgical marker, an appropriate Abbe flap was measured and drawn on the lower lip. Local anesthesia was then infiltrated. A scalpel was then used to incise the upper lip through and through the skin, vermilion, muscle and mucosa, leaving the flap pedicled on the opposite side.  The flap was then rotated and transferred to the lower lip defect.  The flap was then sutured into place with a three layer technique, closing the orbicularis oris muscle layer with subcutaneous buried sutures, followed by a mucosal layer and an epidermal layer.
Estlander Flap (Upper To Lower Lip) Text: The defect of the lower lip was assessed and measured.  Given the location and size of the defect, an Estlander flap was deemed most appropriate.  Using a sterile surgical marker, an appropriate Estlander flap was measured and drawn on the upper lip. Local anesthesia was then infiltrated. A scalpel was then used to incise the lateral aspect of the flap, through skin, muscle and mucosa, leaving the flap pedicled medially.  The flap was then rotated and positioned to fill the lower lip defect.  The flap was then sutured into place with a three layer technique, closing the orbicularis oris muscle layer with subcutaneous buried sutures, followed by a mucosal layer and an epidermal layer.
Cheiloplasty (Less Than 50%) Text: A decision was made to reconstruct the defect with a  cheiloplasty.  The defect was undermined extensively.  Additional obicularis oris muscle was excised with a 15 blade scalpel.  The defect was converted into a full thickness wedge, of less than 50% of the vertical height of the lip, to facilite a better cosmetic result.  Small vessels were then tied off with 5-0 monocyrl. The obicularis oris, superficial fascia, adipose and dermis were then reapproximated.  After the deeper layers were approximated the epidermis was reapproximated with particular care given to realign the vermilion border.
Cheiloplasty (Complex) Text: A decision was made to reconstruct the defect with a  cheiloplasty.  The defect was undermined extensively.  Additional obicularis oris muscle was excised with a 15 blade scalpel.  The defect was converted into a full thickness wedge to facilite a better cosmetic result.  Small vessels were then tied off with 5-0 monocyrl. The obicularis oris, superficial fascia, adipose and dermis were then reapproximated.  After the deeper layers were approximated the epidermis was reapproximated with particular care given to realign the vermilion border.
Ear Wedge Repair Text: A wedge excision was completed by carrying down an excision through the full thickness of the ear and cartilage with an inward facing Burow's triangle. The wound was then closed in a layered fashion.
Full Thickness Lip Wedge Repair (Flap) Text: Given the location of the defect and the proximity to free margins a full thickness wedge repair was deemed most appropriate.  Using a sterile surgical marker, the appropriate repair was drawn incorporating the defect and placing the expected incisions perpendicular to the vermilion border.  The vermilion border was also meticulously outlined to ensure appropriate reapproximation during the repair.  The area thus outlined was incised through and through with a #15 scalpel blade.  The muscularis and dermis were reaproximated with deep sutures following hemostasis. Care was taken to realign the vermilion border before proceeding with the superficial closure.  Once the vermilion was realigned the superfical and mucosal closure was finished.
Ftsg Text: The defect edges were debeveled with a #15 scalpel blade.  Given the location of the defect, shape of the defect and the proximity to free margins a full thickness skin graft was deemed most appropriate.  Using a sterile surgical marker, the primary defect shape was transferred to the donor site. The area thus outlined was incised deep to adipose tissue with a #15 scalpel blade.  The harvested graft was then trimmed of adipose tissue until only dermis and epidermis was left.  The skin margins of the secondary defect were undermined to an appropriate distance in all directions utilizing iris scissors.  The secondary defect was closed with interrupted buried subcutaneous sutures.  The skin edges were then re-apposed with running  sutures.  The skin graft was then placed in the primary defect and oriented appropriately.
Split-Thickness Skin Graft Text: The defect edges were debeveled with a #15 scalpel blade.  Given the location of the defect, shape of the defect and the proximity to free margins a split thickness skin graft was deemed most appropriate.  Using a sterile surgical marker, the primary defect shape was transferred to the donor site. The split thickness graft was then harvested.  The skin graft was then placed in the primary defect and oriented appropriately.
Pinch Graft Text: The defect edges were debeveled with a #15 scalpel blade. Given the location of the defect, shape of the defect and the proximity to free margins a pinch graft was deemed most appropriate. Using a sterile surgical marker, the primary defect shape was transferred to the donor site. The area thus outlined was incised deep to adipose tissue with a #15 scalpel blade.  The harvested graft was then trimmed of adipose tissue until only dermis and epidermis was left. The skin margins of the secondary defect were undermined to an appropriate distance in all directions utilizing iris scissors.  The secondary defect was closed with interrupted buried subcutaneous sutures.  The skin edges were then re-apposed with running  sutures.  The skin graft was then placed in the primary defect and oriented appropriately.
Burow's Graft Text: The defect edges were debeveled with a #15 scalpel blade.  Given the location of the defect, shape of the defect, the proximity to free margins and the presence of a standing cone deformity a Burow's skin graft was deemed most appropriate. The standing cone was removed and this tissue was then trimmed to the shape of the primary defect. The adipose tissue was also removed until only dermis and epidermis were left.  The skin margins of the secondary defect were undermined to an appropriate distance in all directions utilizing iris scissors.  The secondary defect was closed with interrupted buried subcutaneous sutures.  The skin edges were then re-apposed with running  sutures.  The skin graft was then placed in the primary defect and oriented appropriately.
Cartilage Graft Text: The defect edges were debeveled with a #15 scalpel blade.  Given the location of the defect, shape of the defect, the fact the defect involved a full thickness cartilage defect a cartilage graft was deemed most appropriate.  An appropriate donor site was identified, cleansed, and anesthetized. The cartilage graft was then harvested and transferred to the recipient site, oriented appropriately and then sutured into place.  The secondary defect was then repaired using a primary closure.
Composite Graft Text: The defect edges were debeveled with a #15 scalpel blade.  Given the location of the defect, shape of the defect, the proximity to free margins and the fact the defect was full thickness a composite graft was deemed most appropriate.  The defect was outline and then transferred to the donor site.  A full thickness graft was then excised from the donor site. The graft was then placed in the primary defect, oriented appropriately and then sutured into place.  The secondary defect was then repaired using a primary closure.
Epidermal Autograft Text: The defect edges were debeveled with a #15 scalpel blade.  Given the location of the defect, shape of the defect and the proximity to free margins an epidermal autograft was deemed most appropriate.  Using a sterile surgical marker, the primary defect shape was transferred to the donor site. The epidermal graft was then harvested.  The skin graft was then placed in the primary defect and oriented appropriately.
Dermal Autograft Text: The defect edges were debeveled with a #15 scalpel blade.  Given the location of the defect, shape of the defect and the proximity to free margins a dermal autograft was deemed most appropriate.  Using a sterile surgical marker, the primary defect shape was transferred to the donor site. The area thus outlined was incised deep to adipose tissue with a #15 scalpel blade.  The harvested graft was then trimmed of adipose and epidermal tissue until only dermis was left.  The skin graft was then placed in the primary defect and oriented appropriately.
Skin Substitute Text: The defect edges were debeveled with a #15 scalpel blade.  Given the location of the defect, shape of the defect and the proximity to free margins a skin substitute graft was deemed most appropriate.  The graft material was trimmed to fit the size of the defect. The graft was then placed in the primary defect and oriented appropriately.
Tissue Cultured Epidermal Autograft Text: The defect edges were debeveled with a #15 scalpel blade.  Given the location of the defect, shape of the defect and the proximity to free margins a tissue cultured epidermal autograft was deemed most appropriate.  The graft was then trimmed to fit the size of the defect.  The graft was then placed in the primary defect and oriented appropriately.
Xenograft Text: The defect edges were debeveled with a #15 scalpel blade.  Given the location of the defect, shape of the defect and the proximity to free margins a xenograft was deemed most appropriate.  The graft was then trimmed to fit the size of the defect.  The graft was then placed in the primary defect and oriented appropriately.
Purse String (Simple) Text: Given the location of the defect and the characteristics of the surrounding skin a purse string closure was deemed most appropriate.  Undermining was performed circumfirentially around the surgical defect.  A purse string suture was then placed and tightened.
Purse String (Intermediate) Text: Given the location of the defect and the characteristics of the surrounding skin a purse string intermediate closure was deemed most appropriate.  Undermining was performed circumfirentially around the surgical defect.  A purse string suture was then placed and tightened.
Partial Purse String (Simple) Text: Given the location of the defect and the characteristics of the surrounding skin a simple purse string closure was deemed most appropriate.  Undermining was performed circumfirentially around the surgical defect.  A purse string suture was then placed and tightened. Wound tension only allowed a partial closure of the circular defect.
Partial Purse String (Intermediate) Text: Given the location of the defect and the characteristics of the surrounding skin an intermediate purse string closure was deemed most appropriate.  Undermining was performed circumfirentially around the surgical defect.  A purse string suture was then placed and tightened. Wound tension only allowed a partial closure of the circular defect.
Localized Dermabrasion With Wire Brush Text: The patient was draped in routine manner.  Localized dermabrasion using 3 x 17 mm wire brush was performed in routine manner to papillary dermis. This spot dermabrasion is being performed to complete skin cancer reconstruction. It also will eliminate the other sun damaged precancerous cells that are known to be part of the regional effect of a lifetime's worth of sun exposure. This localized dermabrasion is therapeutic and should not be considered cosmetic in any regard.
Localized Dermabrasion With Sand Papertext: The patient was draped in routine manner.  Localized dermabrasion using sterile sand paper was performed in routine manner to papillary dermis. This spot dermabrasion is being performed to complete skin cancer reconstruction. It also will eliminate the other sun damaged precancerous cells that are known to be part of the regional effect of a lifetime's worth of sun exposure. This localized dermabrasion is therapeutic and should not be considered cosmetic in any regard.
Localized Dermabrasion With 15 Blade Text: The patient was draped in routine manner.  Localized dermabrasion using a 15 blade was performed in routine manner to papillary dermis. This spot dermabrasion is being performed to complete skin cancer reconstruction. It also will eliminate the other sun damaged precancerous cells that are known to be part of the regional effect of a lifetime's worth of sun exposure. This localized dermabrasion is therapeutic and should not be considered cosmetic in any regard.
Tarsorrhaphy Text: A tarsorrhaphy was performed using Frost sutures.
Intermediate Repair And Flap Additional Text (Will Appearing After The Standard Complex Repair Text): The intermediate repair was not sufficient to completely close the primary defect. The remaining additional defect was repaired with the flap mentioned below.
Intermediate Repair And Graft Additional Text (Will Appearing After The Standard Complex Repair Text): The intermediate repair was not sufficient to completely close the primary defect. The remaining additional defect was repaired with the graft mentioned below.
Complex Repair And Flap Additional Text (Will Appearing After The Standard Complex Repair Text): The complex repair was not sufficient to completely close the primary defect. The remaining additional defect was repaired with the flap mentioned below.
Complex Repair And Graft Additional Text (Will Appearing After The Standard Complex Repair Text): The complex repair was not sufficient to completely close the primary defect. The remaining additional defect was repaired with the graft mentioned below.
Eyelid Full Thickness Repair - 34976: The eyelid defect was full thickness which required a wedge repair of the eyelid. Special care was taken to ensure that the eyelid margin was realligned when placing sutures.
Eyelid Partial Thickness Repair - 55532: The eyelid defect was partial thickness which required a wedge repair of the eyelid. Special care was taken to ensure that the eyelid margin was realligned when placing sutures.
Manual Repair Warning Statement: We plan on removing the manually selected variable below in favor of our much easier automatic structured text blocks found in the previous tab. We decided to do this to help make the flow better and give you the full power of structured data. Manual selection is never going to be ideal in our platform and I would encourage you to avoid using manual selection from this point on, especially since I will be sunsetting this feature. It is important that you do one of two things with the customized text below. First, you can save all of the text in a word file so you can have it for future reference. Second, transfer the text to the appropriate area in the Library tab. Lastly, if there is a flap or graft type which we do not have you need to let us know right away so I can add it in before the variable is hidden. No need to panic, we plan to give you roughly 6 months to make the change.
Same Histology In Subsequent Stages Text: The pattern and morphology of the tumor is as described in the first stage.
No Residual Tumor Seen Histology Text: Normal epidermis, dermis, and subcutaneous and/or deeper tissue (where applicable) were noted on frozen sections during Mohs Surgery. There were no malignant cells seen in the sections examined.
Inflammation Suggestive Of Cancer Camouflage Histology Text: There was a dense lymphocytic infiltrate which prevented adequate histologic evaluation of adjacent structures.
Incidental Squamous Cell Carcinoma In Situ Histology Text: Incidental squamous cell carcinoma in situ was noted.  The epidermis shows acanthosis with elongation and thickening of the rete ridges.  There are varying degrees of atypia seen within the epidermal cells.  The anaplastic cells are enlarged with hyperchromatic nuclei.  Multiple nucleated epidermal cells are present and parakeratotic nuclei.  Multiple nucleated epidermal cells are present and parakeratotic cells are seen in the stratum corneum.  The anaplastic cells appear to be confined to the epidermis without penetration of the dermal-epidermal junction.
Incidental Actinic Keratosis Histology Text: Actinic keratosis was noted on frozen sections.  The specimen contains foci of disordered epidermal cells confined to the epidermis.  The cells have anaplastic nuclei with parakeratosis and a thickened granular cell layer.  There is papillomatosis and some hyperkeratosis.
Bcc Histology Text: The dermis contains distinctive tumor cell masses of various shapes and sizes composed of cells with large oval or elongated nuclei with relatively little cytoplasm.  The nuclei are homogenous.  There is no pronounced variation in size or intensity of staining and no significant anaplastic appearance.  The cells at the outer aspect of the tumor masses show palisading of nuclei.  Tumor masses are surrounded by a connective tissue stroma and in some areas there is retraction artifact of the tumor nodule away from the surrounding stroma.
Bcc Infiltrative Histology Text: The dermis contains distinctive tumor cell masses of various shapes and sizes composed of cells with large oval or elongated nuclei with relatively little cytoplasm.  The nuclei are homogenous.  There is no pronounced variation in size or intensity of staining and no significant anaplastic appearance.  The cells at the outer aspect of the tumor masses show palisading of nuclei.  Tumor masses are surrounded by a connective tissue stroma and in some areas there is retraction artifact of the tumor nodule away from the surrounding stroma.  The tumor nests are sharply angulated and demonstrating an infiltrating pattern extending deeply with surrounding fibrosis.
Bcc Infundibulocystic Histology Text: The dermis contains distinctive tumor cell masses of various shapes and sizes composed of cells with large oval or elongated nuclei with relatively little cytoplasm.  The nuclei are homogenous.  There is no pronounced variation in size or intensity of staining and no significant anaplastic appearance.  The cells at the outer aspect of the tumor masses show palisading of nuclei.  Tumor masses are surrounded by a connective tissue stroma and in some areas there is retraction artifact of the tumor nodule away from the surrounding stroma.  An infundibulocystic histology is noted.
Bcc Macronodular Histology Text: The dermis contains distinctive tumor cell masses of various shapes and sizes composed of cells with large oval or elongated nuclei with relatively little cytoplasm.  The nuclei are homogenous.  There is no pronounced variation in size or intensity of staining and no significant anaplastic appearance.  The cells at the outer aspect of the tumor masses show palisading of nuclei.  Tumor masses are surrounded by a connective tissue stroma and in some areas there is retraction artifact of the tumor nodule away from the surrounding stroma.  A macronodular histology is noted.
Bcc Micronodular Histology Text: The dermis contains distinctive tumor cell masses of various shapes and sizes composed of cells with large oval or elongated nuclei with relatively little cytoplasm.  The nuclei are homogenous.  There is no pronounced variation in size or intensity of staining and no significant anaplastic appearance.  The cells at the outer aspect of the tumor masses show palisading of nuclei.  Tumor masses are surrounded by a connective tissue stroma and in some areas there is retraction artifact of the tumor nodule away from the surrounding stroma.  A micronodular histology is noted.
Bcc  Morpheaform/Sclerosing Histology Text: The dermis contains distinctive tumor cell masses of various shapes and sizes composed of cells with large oval or elongated nuclei with relatively little cytoplasm.  The nuclei are homogenous.  There is no pronounced variation in size or intensity of staining and no significant anaplastic appearance.  The cells at the outer aspect of the tumor masses show palisading of nuclei.  Tumor masses are surrounded by a connective tissue stroma and in some areas there is retraction artifact of the tumor nodule away from the surrounding stroma.  The tumor nests are sharply angulated and demonstrating an infiltrating pattern extending deeply with surrounding fibrosis, consistent with morpheaform/sclerosing histology.
Bcc  Nodular Histology Text: The dermis contains distinctive tumor cell masses of various shapes and sizes composed of cells with large oval or elongated nuclei with relatively little cytoplasm.  The nuclei are homogenous.  There is no pronounced variation in size or intensity of staining and no significant anaplastic appearance.  The cells at the outer aspect of the tumor masses show palisading of nuclei.  Tumor masses are surrounded by a connective tissue stroma and in some areas there is retraction artifact of the tumor nodule away from the surrounding stroma.  A nodular histology is noted.
Bcc  Nodulocystic Histology Text: The dermis contains distinctive tumor cell masses of various shapes and sizes composed of cells with large oval or elongated nuclei with relatively little cytoplasm.  The nuclei are homogenous.  There is no pronounced variation in size or intensity of staining and no significant anaplastic appearance.  The cells at the outer aspect of the tumor masses show palisading of nuclei.  Tumor masses are surrounded by a connective tissue stroma and in some areas there is retraction artifact of the tumor nodule away from the surrounding stroma.  A nodulocystic histology is noted.
Bcc Pigmented Histology Text: The dermis contains distinctive tumor cell masses of various shapes and sizes composed of cells with large oval or elongated nuclei with relatively little cytoplasm.  The nuclei are homogenous.  There is no pronounced variation in size or intensity of staining and no significant anaplastic appearance.  The cells at the outer aspect of the tumor masses show palisading of nuclei.  Tumor masses are surrounded by a connective tissue stroma and in some areas there is retraction artifact of the tumor nodule away from the surrounding stroma.  Pigmented tumor cells are noted.
Bcc Superficial Histology Text: The dermis contains distinctive tumor cell masses of various shapes and sizes composed of cells with large oval or elongated nuclei with relatively little cytoplasm.  The nuclei are homogenous.  There is no pronounced variation in size or intensity of staining and no significant anaplastic appearance.  The cells at the outer aspect of the tumor masses show palisading of nuclei.  Tumor masses are surrounded by a connective tissue stroma and in some areas there is retraction artifact of the tumor nodule away from the surrounding stroma.  A superficial pattern is noted.
Bcc Superficial Pigmented Histology Text: The dermis contains distinctive tumor cell masses of various shapes and sizes composed of cells with large oval or elongated nuclei with relatively little cytoplasm.  The nuclei are homogenous.  There is no pronounced variation in size or intensity of staining and no significant anaplastic appearance.  The cells at the outer aspect of the tumor masses show palisading of nuclei.  Tumor masses are surrounded by a connective tissue stroma and in some areas there is retraction artifact of the tumor nodule away from the surrounding stroma.  A superficial pigmented pattern is noted.
Mixed Superficial And Nodular Bcc Histology Text: The dermis contains distinctive tumor cell masses of various shapes and sizes composed of cells with large oval or elongated nuclei with relatively little cytoplasm.  The nuclei are homogenous.  There is no pronounced variation in size or intensity of staining and no significant anaplastic appearance.  The cells at the outer aspect of the tumor masses show palisading of nuclei.  Tumor masses are surrounded by a connective tissue stroma and in some areas there is retraction artifact of the tumor nodule away from the surrounding stroma.  A mixed superficial and nodular pattern is noted.
Mixed Nodular And Infiltrative Bcc Histology Text: The dermis contains distinctive tumor cell masses of various shapes and sizes composed of cells with large oval or elongated nuclei with relatively little cytoplasm.  The nuclei are homogenous.  There is no pronounced variation in size or intensity of staining and no significant anaplastic appearance.  The cells at the outer aspect of the tumor masses show palisading of nuclei.  Tumor masses are surrounded by a connective tissue stroma and in some areas there is retraction artifact of the tumor nodule away from the surrounding stroma.  There is an admixture of nodular pattern BCC, as well as tumor nests that are sharply angulated and demonstrating an infiltrating pattern extending deeply with surrounding fibrosis.
Mixed Nodular And Micronodular Bcc Histology Text: The dermis contains distinctive tumor cell masses of various shapes and sizes composed of cells with large oval or elongated nuclei with relatively little cytoplasm.  The nuclei are homogenous.  There is no pronounced variation in size or intensity of staining and no significant anaplastic appearance.  The cells at the outer aspect of the tumor masses show palisading of nuclei.  Tumor masses are surrounded by a connective tissue stroma and in some areas there is retraction artifact of the tumor nodule away from the surrounding stroma.  A mixed nodular and micronodular pattern is noted.
Metatypical Bcc Histology Text: The dermis contains distinctive tumor cell masses of various shapes and sizes composed of cells with large oval or elongated nuclei with relatively little cytoplasm.  The nuclei are homogenous.  There is no pronounced variation in size or intensity of staining and no significant anaplastic appearance.  The cells at the outer aspect of the tumor masses show palisading of nuclei.  Tumor masses are surrounded by a connective tissue stroma and in some areas there is retraction artifact of the tumor nodule away from the surrounding stroma.  Squamous differentiation consistent with metatypical BCC is noted.
Scc Histology Text: There are irregular masses of epidermal cells in the upper dermis.  Within the tumor masses, there are varying proportions of normal squamous cells and anaplastic squamous cells.  The anaplastic cells have variation in size and shape with hyperplasia and hyperchromasia of the nuclei, absence of intracellular bridges and varying degrees of keratinization.
Scc Well Differentiated Histology Text: There are irregular masses of epidermal cells in the upper dermis.  Within the tumor masses, there are varying proportions of normal squamous cells and anaplastic squamous cells.  The anaplastic cells have variation in size and shape with hyperplasia and hyperchromasia of the nuclei, absence of intracellular bridges and varying degrees of keratinization.  A well differentiated pattern is noted.
Scc Moderately Differentiated Histology Text: There are irregular masses of epidermal cells in the upper dermis.  Within the tumor masses, there are varying proportions of normal squamous cells and anaplastic squamous cells.  The anaplastic cells have variation in size and shape with hyperplasia and hyperchromasia of the nuclei, absence of intracellular bridges and varying degrees of keratinization.  A moderately differentiated pattern is noted.
Scc Poorly Differentiated Histology Text: There are irregular masses of epidermal cells in the upper dermis.  Within the tumor masses, there are varying proportions of normal squamous cells and anaplastic squamous cells.  The anaplastic cells have variation in size and shape with hyperplasia and hyperchromasia of the nuclei, absence of intracellular bridges and varying degrees of keratinization.  A poorly differentiated pattern is noted.
Scc Acantholytic Histology Text: There are irregular masses of epidermal cells in the upper dermis.  Within the tumor masses, there are varying proportions of normal squamous cells and anaplastic squamous cells.  The anaplastic cells have variation in size and shape with hyperplasia and hyperchromasia of the nuclei, absence of intracellular bridges and varying degrees of keratinization.  An acantholytic pattern is noted.
Scc Ka Subtype Histology Text: On low power, a keratin filled invagination of the epidermis is noted to penetrate into the dermis.  There appear to be collections of epidermal cells with nuclear atypia in and around the base of the invagination.  These dyskeratotic cells show individual cells keratinization and appear eosinophilic.  A pronounced inflammatory cell infiltrate is noted in the surrounding dermis.  The surrounding epidermis extends as a buttress over the crater formed by the invagination.  At high magnification, the epidermal proliferations at the base of the crater show enlarged epidermal cells with hyperchromatic nuclei, consistent with SCC keratoacanthoma subtype.
Scc Spindle Histology Text: There are irregular masses of epidermal cells in the upper dermis.  Within the tumor masses, there are varying proportions of normal squamous cells and anaplastic squamous cells.  The anaplastic cells have variation in size and shape with hyperplasia and hyperchromasia of the nuclei, absence of intracellular bridges and varying degrees of keratinization.  A spindle cell pattern is noted.
Scc In Situ Histology Text: The epidermis shows acanthosis with elongation and thickening of the rete ridges.  There are varying degrees of atypia seen within the epidermal cells.  The anaplastic cells are enlarged with hyperchromatic nuclei.  Multiple nucleated epidermal cells are present and parakeratotic nuclei.  Multiple nucleated epidermal cells are present and parakeratotic cells are seen in the stratum corneum.  The anaplastic cells appear to be confined to the epidermis without penetration of the dermal- epidermal junction.
Lentigo Maligna Histology Text: Small clusters of uniformly round large melanocytes with atypical hyperchromatic nuclei and abundant cytoplasm are seen scattered throughout the lower levels of the epidermis.  Similar cells are seen singly in the upper layers of the epidermis.  There appears to be no penetration of these atypical melanocytes through the dermal epidermal junction and all cells appear to be confined to the epidermis.
Merkel Cell Carcinoma Histology Text: In the dermis and extending into the subcutaneous tissue, there are multiple anastomosing cords, clusters and sheets of basophilic cells.  These collections of cells do not appear to be connected to the epidermis.  Individual tumor cells have a very homogenous appearance.  They are closely spaced round ells that stain intensely blue with hematoxylin eosin.  These cells have vesiculare nuclei and scantly ill -defined cytoplasm.  Mitotic figures are seen.
Afx Histology Text: Mild to severe solar elastosis is noted.  Aggregates of dermal tumor cells  with moderate to severe pleomorphism with spindle, epithelioid, or multinucleated forms and atypical mitotic figures are seen.
Basosquamous Cell Carcinoma Histology Text: There are multiple islands of malignant cells throughout the dermis, exhibiting both basaloid and squamous differentiation. Some areas show BCC, with large and rounded basaloid cells, while others show SCC, with polygonal squamoid cells with abundant eosinophilic cytoplasm, large nuclei, and prominent nucleoli. The SCC component exhibited frequent atypical mitosis, cell and nuclear pleomorphism and hyperchromatism of the nuclei.
Dfsp Histology Text: In the dermis and subcutaneous fat there is a tumor mass consisting of cells with large spindle shaped nuclei embedded in varying amounts of collagen.  In some areas these cells are arranged in irregular bands while in others the intertwining bands result in a matte -like or a whorl -like fashion.  In some areas, the cells have enlarged nuclei with a moderate degree of atypia, and mitotic figures are seen.
Microcystic Adnexal Carcinoma Histology Text: Atypical epithelial cells are seen in the dermis arranged singly or in groups or organized as a cell-lined cystic cavity.  These cells are small in size with anaplastic appearing small nuclei.  When seen in the cystic configuration they resemble sweat ducts or glands.  In some areas the cells assume a spindle shape.  A slight stromal reaction is seen around the cells.
Sebaceous Carcinoma Histology Text: In the dermis, there are irregular lobules of varying size composed of undifferentiated sebaceous cells with foamy cytoplasm.  These cells, as well as their nuclei are of various sizes and shape.
Mart-1 - Positive Histology Text: MART-1 staining demonstrates areas of higher density and clustering of melanocytes with Pagetoid spread upwards within the epidermis. The surgical margins are positive for tumor cells.
Mart-1 - Negative Histology Text: MART-1 staining demonstrates a normal density and pattern of melanocytes along the dermal-epidermal junction. The surgical margins are negative for tumor cells.
Clia Id #: [id number]
Information: Selecting Yes will display possible errors in your note based on the variables you have selected. This validation is only offered as a suggestion for you. PLEASE NOTE THAT THE VALIDATION TEXT WILL BE REMOVED WHEN YOU FINALIZE YOUR NOTE. IF YOU WANT TO FAX A PRELIMINARY NOTE YOU WILL NEED TO TOGGLE THIS TO 'NO' IF YOU DO NOT WANT IT IN YOUR FAXED NOTE.
Bill 59 Modifier?: No - Continue to Bill 79 Modifier

## 2024-06-25 NOTE — PROCEDURE: BIOPSY BY SHAVE METHOD
Detail Level: Detailed
Depth Of Biopsy: dermis
Was A Bandage Applied: Yes
Size Of Lesion In Cm: 1.1
X Size Of Lesion In Cm: 0.7
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  Biopsy was performed today so that this area may be included in the Mohs procedure (along with the intervening scar) if pathology demonstrates SCCIS.  If it shows AK, topical treatment may be considered postoperatively.
Biopsy Type: H and E
Biopsy Method: 15 blade
Anesthesia Type: 1% lidocaine with 1:100,000 epinephrine
Anesthesia Volume In Cc: 1
Additional Anesthesia Volume In Cc (Will Not Render If 0): 0
Hemostasis: Electrodesiccation
Wound Care: Petrolatum
Dressing: bandage
Destruction After The Procedure: No
Type Of Destruction Used: Curettage
Curettage Text: The wound bed was treated with curettage after the biopsy was performed.
Cryotherapy Text: The wound bed was treated with cryotherapy after the biopsy was performed.
Electrodesiccation Text: The wound bed was treated with electrodesiccation after the biopsy was performed.
Electrodesiccation And Curettage Text: The wound bed was treated with electrodesiccation and curettage after the biopsy was performed.
Silver Nitrate Text: The wound bed was treated with silver nitrate after the biopsy was performed.
Lab: [id number]
Lab Facility: [id number]
Consent: Written consent was obtained and risks were reviewed including but not limited to scarring, infection, bleeding, incomplete removal, nerve damage and allergy to anesthesia.
Post-Care Instructions: I reviewed with the patient in detail post-care instructions. Patient is to keep the biopsy site dry overnight.  Patient will then clean the area daily and apply petrolatum ointment and a bandage, replacing this daily until healed.
Notification Instructions: Patient will be notified of biopsy results. However, patient instructed to call the office if not contacted within 2 weeks.
Billing Type: Third-Party Bill
Information: Selecting Yes will display possible errors in your note based on the variables you have selected. This validation is only offered as a suggestion for you. PLEASE NOTE THAT THE VALIDATION TEXT WILL BE REMOVED WHEN YOU FINALIZE YOUR NOTE. IF YOU WANT TO FAX A PRELIMINARY NOTE YOU WILL NEED TO TOGGLE THIS TO 'NO' IF YOU DO NOT WANT IT IN YOUR FAXED NOTE.

## 2024-06-25 NOTE — PROCEDURE: CONSULTATION FOR MOHS SURGERY
Detail Level: Detailed
Size Of Lesion: 1.3
X Size Of Lesion In Cm (Optional): 0.7
Incorporate Mauc In Note: Yes

## 2024-07-09 ENCOUNTER — APPOINTMENT (RX ONLY)
Dept: URBAN - METROPOLITAN AREA CLINIC 26 | Facility: CLINIC | Age: 69
Setting detail: DERMATOLOGY
End: 2024-07-09

## 2024-07-09 PROBLEM — D04.39 CARCINOMA IN SITU OF SKIN OF OTHER PARTS OF FACE: Status: ACTIVE | Noted: 2024-07-09

## 2024-07-09 PROBLEM — C44.529 SQUAMOUS CELL CARCINOMA OF SKIN OF OTHER PART OF TRUNK: Status: ACTIVE | Noted: 2024-07-09

## 2024-07-09 PROCEDURE — ? POST-OP WOUND CHECK (NO GLOBAL PERIOD)

## 2024-07-09 PROCEDURE — 17311 MOHS 1 STAGE H/N/HF/G: CPT

## 2024-07-09 PROCEDURE — 99212 OFFICE O/P EST SF 10 MIN: CPT | Mod: 25

## 2024-07-09 PROCEDURE — 13132 CMPLX RPR F/C/C/M/N/AX/G/H/F: CPT

## 2024-07-09 PROCEDURE — ? MOHS SURGERY

## 2024-07-09 NOTE — PROCEDURE: MOHS SURGERY
Mohs Case Number: 
Date Of Previous Biopsy (Optional): 06-05-24
Previous Accession (Optional): [id number]
Biopsy Photograph Reviewed: Yes
Referring Physician (Optional): Venecia Carter PA-C
Consent Type: Consent 1 (Standard)
Eye Shield Used: No
Initial Size Of Lesion: 1.3
X Size Of Lesion In Cm (Optional): 0.7
Number Of Stages: 1
Primary Defect Length In Cm (Final Defect Size - Required For Flaps/Grafts): 1.8
Primary Defect Depth In Cm (Optional But Required For Some Insurers): 0
Repair Type: Complex Repair
Which Instrument Did You Use For Dermabrasion?: Wire Brush
Which Eyelid Repair Cpt Are You Using?: 64032
Oculoplastic Surgeon Procedure Text (A): After obtaining clear surgical margins the patient was sent to oculoplastics for surgical repair.  The patient understands they will receive post-surgical care and follow-up from the referring physician's office.
Otolaryngologist Procedure Text (A): After obtaining clear surgical margins the patient was sent to otolaryngology for surgical repair.  The patient understands they will receive post-surgical care and follow-up from the referring physician's office.
Plastic Surgeon Procedure Text (A): After obtaining clear surgical margins the patient was sent to plastics for surgical repair.  The patient understands they will receive post-surgical care and follow-up from the referring physician's office.
Mid-Level Procedure Text (A): After obtaining clear surgical margins the patient was sent to a mid-level provider for surgical repair.  The patient understands they will receive post-surgical care and follow-up from the mid-level provider.
Provider Procedure Text (A): After obtaining clear surgical margins the defect was repaired by another provider.
Asc Procedure Text (A): After obtaining clear surgical margins the patient was sent to an ASC for surgical repair.  The patient understands they will receive post-surgical care and follow-up from the ASC physician.
Simple / Intermediate / Complex Repair - Final Wound Length In Cm: 4.4
Suturegard Retention Suture: 2-0 Nylon
Retention Suture Bite Size: 3 mm
Length To Time In Minutes Device Was In Place: 10
Complex/Intermediate Repair Variations: Crescentic
Distance Of Undermining In Cm (Required): 1.7
Undermining Type: Entire Wound
Debridement Text: The wound edges were debrided prior to proceeding with the closure to facilitate wound healing.
Helical Rim Text: The closure involved the helical rim.
Vermilion Border Text: The closure involved the vermilion border.
Nostril Rim Text: The closure involved the nostril rim.
Retention Suture Text: Retention sutures were placed to support the closure and prevent dehiscence.
Location Indication Override (Is Already Calculated Based On Selected Body Location): Area M
Area H Indication Text: Tumors in this location are included in Area H (eyelids, eyebrows, central face, nose, lips, chin, ear, pre-auricular, post-auricular, temple, genitalia, hands, feet, ankles and areola).  Tissue conservation is critical in these anatomic locations.
Area M Indication Text: Tumors in this location are included in Area M (cheek, forehead, scalp, neck, jawline and pretibial skin).  Mohs surgery is indicated for tumors in these anatomic locations.
Area L Indication Text: Tumors in this location are included in Area L (trunk and extremities).  Mohs surgery is indicated for larger tumors, or tumors with aggressive histologic features, in these anatomic locations.
Tumor Debulked?: scalpel
Depth Of Tumor Invasion (For Histology): tumor not visualized (deep and peripheral margins are clear of tumor)
Perineural Invasion (For Histology - Be Specific If Possible): absent
Special Stains Stage 1 - Results: Base On Clearance Noted Above
Stage 2: Additional Anesthesia Type: 1% lidocaine with 1:100,000 epinephrine
Staging Info: By selecting yes to the question above you will include information on AJCC 8 tumor staging in your Mohs note. Information on tumor staging will be automatically added for SCCs on the head and neck. AJCC 8 includes tumor size, tumor depth, perineural involvement and bone invasion.
Tumor Depth: Less than 6mm from granular layer and no invasion beyond the subcutaneous fat
Why Was The Change Made?: Please Select the Appropriate Response
Medical Necessity Statement: Based on my medical judgement, Mohs surgery is the most appropriate treatment for this cancer compared to other treatments.
Alternatives Discussed Intro (Do Not Add Period): I discussed alternative treatments to Mohs surgery and specifically discussed the risks and benefits of
Consent 1/Introductory Paragraph: The rationale for Mohs was explained to the patient and consent was obtained. The risks, benefits and alternatives to therapy were discussed in detail. Specifically, the risks of infection, scarring, bleeding, prolonged wound healing, incomplete removal, allergy to anesthesia, nerve injury and recurrence were addressed. Prior to the procedure, the treatment site was clearly identified and confirmed by the patient using a hand mirror. All components of Universal Protocol/PAUSE Rule completed.
Consent 2/Introductory Paragraph: I explained to the patient that excisional surgery with margin control is the standard treatment for melanoma.  Mohs Surgery, which is a form of excisional surgery with margin control, over the past few decades has become a treatment within that standard of care for melanoma.  Many now consider it the standard of care for melanoma, especially melanoma in situ, on the head and neck given its more complete margin examination, and evidence supporting higher cure rate compared with conventional excision.  In this case, initial margins of 0.6 cm, in excess of the standard margins of 0.5 cm were employed for the first stage of Mohs Surgery.  \\n\\nMohs surgery was explained to the patient and consent was obtained. The risks, benefits and alternatives to therapy were discussed in detail. Specifically, the risks of infection, scarring, bleeding, prolonged wound healing, incomplete removal, allergy to anesthesia, nerve injury and recurrence were addressed. Prior to the procedure, the treatment site was clearly identified and confirmed by the patient  using a hand mirror. All components of Universal Protocol/PAUSE Rule completed.  \\n\\nMART-1 (Melanoma Antigen Recognized by T-cells) antibody immunostaining was used during Mohs surgery as per standard protocol, in addition to routine processing of all specimens with hematoxylin and eosin. One or more of the reagents used in immunohistochemical testing in this case may not have been cleared or approved by the [place]. Food and Drug Administration (FDA). The FDA has determined that such clearance or approval is not necessary. These tests are used for clinical purposes. They should not be regarded as investigational or for research.
Consent 3/Introductory Paragraph: I gave the patient a chance to ask questions they had about the procedure.  Following this I explained the Mohs procedure and consent was obtained. The risks, benefits and alternatives to therapy were discussed in detail. Specifically, the risks of infection, scarring, bleeding, prolonged wound healing, incomplete removal, allergy to anesthesia, nerve injury and recurrence were addressed. Prior to the procedure, the treatment site was clearly identified and confirmed by the patient using a hand mirror. All components of Universal Protocol/PAUSE Rule completed.
Consent (Temporal Branch)/Introductory Paragraph: The rationale for Mohs was explained to the patient and consent was obtained. The risks, benefits and alternatives to therapy were discussed in detail. Specifically, the risks of damage to the temporal branch of the facial nerve, infection, scarring, bleeding, prolonged wound healing, incomplete removal, allergy to anesthesia, and recurrence were addressed. Prior to the procedure, the treatment site was clearly identified and confirmed by the patient using a hand mirror. All components of Universal Protocol/PAUSE Rule completed.
Consent (Marginal Mandibular)/Introductory Paragraph: The rationale for Mohs was explained to the patient and consent was obtained. The risks, benefits and alternatives to therapy were discussed in detail. Specifically, the risks of damage to the marginal mandibular branch of the facial nerve, infection, scarring, bleeding, prolonged wound healing, incomplete removal, allergy to anesthesia, and recurrence were addressed. Prior to the procedure, the treatment site was clearly identified and confirmed by the patient using a hand mirror. All components of Universal Protocol/PAUSE Rule completed.
Consent (Spinal Accessory)/Introductory Paragraph: The rationale for Mohs was explained to the patient and consent was obtained. The risks, benefits and alternatives to therapy were discussed in detail. Specifically, the risks of damage to the spinal accessory nerve, infection, scarring, bleeding, prolonged wound healing, incomplete removal, allergy to anesthesia, and recurrence were addressed. Prior to the procedure, the treatment site was clearly identified and confirmed by the patient using a hand mirror. All components of Universal Protocol/PAUSE Rule completed.
Consent (Near Eyelid Margin)/Introductory Paragraph: The rationale for Mohs was explained to the patient and consent was obtained. The risks, benefits and alternatives to therapy were discussed in detail. Specifically, the risks of ectropion or eyelid deformity, infection, scarring, bleeding, prolonged wound healing, incomplete removal, allergy to anesthesia, nerve injury and recurrence were addressed. Prior to the procedure, the treatment site was clearly identified and confirmed by the patient using a hand mirror. All components of Universal Protocol/PAUSE Rule completed.
Consent (Ear)/Introductory Paragraph: The rationale for Mohs was explained to the patient and consent was obtained. The risks, benefits and alternatives to therapy were discussed in detail. Specifically, the risks of ear deformity, infection, scarring, bleeding, prolonged wound healing, incomplete removal, allergy to anesthesia, nerve injury and recurrence were addressed. Prior to the procedure, the treatment site was clearly identified and confirmed by the patient using a hand mirror. All components of Universal Protocol/PAUSE Rule completed.
Consent (Nose)/Introductory Paragraph: The rationale for Mohs was explained to the patient and consent was obtained. The risks, benefits and alternatives to therapy were discussed in detail. Specifically, the risks of nasal deformity, changes in the flow of air through the nose, infection, scarring, bleeding, prolonged wound healing, incomplete removal, allergy to anesthesia, nerve injury and recurrence were addressed. Prior to the procedure, the treatment site was clearly identified and confirmed by the patient using a hand mirror. All components of Universal Protocol/PAUSE Rule completed.
Consent (Lip)/Introductory Paragraph: The rationale for Mohs was explained to the patient and consent was obtained. The risks, benefits and alternatives to therapy were discussed in detail. Specifically, the risks of lip deformity, changes in the oral aperture, infection, scarring, bleeding, prolonged wound healing, incomplete removal, allergy to anesthesia, nerve injury and recurrence were addressed. Prior to the procedure, the treatment site was clearly identified and confirmed by the patient using a hand mirror. All components of Universal Protocol/PAUSE Rule completed.
Consent (Scalp)/Introductory Paragraph: The rationale for Mohs was explained to the patient and consent was obtained. The risks, benefits and alternatives to therapy were discussed in detail. Specifically, the risks of alopecia in or around the treatment site, changes in hair growth pattern secondary to repair, infection, scarring, bleeding, prolonged wound healing, incomplete removal, allergy to anesthesia, nerve injury and recurrence were addressed. Prior to the procedure, the treatment site was clearly identified and confirmed by the patient using a hand mirror. All components of Universal Protocol/PAUSE Rule completed.
Detail Level: Detailed
Postop Diagnosis: same
Surgeon: Patricio Hatfield M.D.
Anesthesia Type: 1% lidocaine with epinephrine
Anesthesia Volume In Cc: 2
Hemostasis: Electrodesiccation
Estimated Blood Loss (Cc): minimal
Repair Anesthesia Method: local infiltration
Anesthesia Volume In Cc: 3
Brow Lift Text: A midfrontal incision was made medially to the defect to allow access to the tissues just superior to the left eyebrow. Following careful dissection inferiorly in a supraperiosteal plane to the level of the left eyebrow, several 3-0 monocryl sutures were used to resuspend the eyebrow orbicularis oculi muscular unit to the superior frontal bone periosteum. This resulted in an appropriate reapproximation of static eyebrow symmetry and correction of the left brow ptosis.
Deep Sutures: 4-0 Vicryl
Epidermal Sutures: GluSeal
Suturegard Intro: Intraoperative tissue expansion was performed, utilizing the SUTUREGARD device, in order to reduce wound tension.
Suturegard Body: The suture ends were repeatedly re-tightened and re-clamped to achieve the desired tissue expansion.
Hemigard Intro: Due to skin fragility and wound tension, it was decided to use HEMIGARD adhesive retention suture devices to permit a linear closure. The skin was cleaned and dried for a 6cm distance away from the wound. Excessive hair, if present, was removed to allow for adhesion.
Hemigard Postcare Instructions: The HEMIGARD strips are to remain completely dry for at least 5-7 days.
Donor Site Anesthesia Type: same as repair anesthesia
Graft Basting Suture (Optional): 6-0 Prolene
Graft Donor Site Dermal Sutures (Optional): 5-0 Vicryl
Epidermal Closure Graft Donor Site (Optional): running
Graft Donor Site Bandage (Optional-Leave Blank If You Don't Want In Note): A pressure bandage with telfa was applied to the donor site.
Closure 2 Information: This tab is for additional flaps and grafts, including complex repair and grafts and complex repair and flaps. You can also specify a different location for the additional defect, if the location is the same you do not need to select a new one. We will insert the automated text for the repair you select below just as we do for solitary flaps and grafts. Please note that at this time if you select a location with a different insurance zone you will need to override the ICD10 and CPT if appropriate.
Closure 3 Information: This tab is for additional flaps and grafts above and beyond our usual structured repairs.  Please note if you enter information here it will not currently bill and you will need to add the billing information manually.
Wound Care: Mastisol
Dressing: steri-strips and pressure dressing
Unna Boot Text: An Unna boot was placed to help immobilize the limb and facilitate more rapid healing.
Home Suture Removal Text: Patient was provided instructions on removing sutures and will remove their sutures at home.  If they have any questions or difficulties they will call the office.
Post-Care Instructions: I reviewed with the patient in detail post-care instructions. Patient is not to engage in any strenuous activity for at least 48 hours, and is to avoid heavy lifting, strenuous exercise, or swimming for the next 14 days. Should the patient develop any fevers, chills, bleeding, or severe pain, the patient will contact the office immediately.
Pain Refusal Text: I offered to prescribe pain medication but the patient refused to take this medication.
Mauc Instructions: By selecting yes to the question below the MAUC number will be added into the note.  This will be calculated automatically based on the diagnosis chosen, the size entered, the body zone selected (H,M,L) and the specific indications you chose. You will also have the option to override the Mohs AUC if you disagree with the automatically calculated number and this option is found in the Case Summary tab.
Where Do You Want The Question To Include Opioid Counseling Located?: Case Summary Tab
Eye Protection Verbiage: Before proceeding with the stage, a plastic scleral shield was inserted. The globe was anesthetized with a few drops of 1% lidocaine with 1:100,000 epinephrine. Then, an appropriate sized scleral shield was chosen and coated with lacrilube ointment. The shield was gently inserted and left in place for the duration of each stage. After the stage was completed, the shield was gently removed.
Mohs Method Verbiage: After tumor debulking, and with each stage, an incision was made into a layer of healthy appearing skin around the clinically apparent tissue following the standard Mohs approach.  The specimen was harvested as a microscopically controlled layer.  A map was prepared to correspond to the area of skin from which it was excised.  The tissue was inked, divided as necessary, and prepared for frozen section processing.
Surgeon/Pathologist Verbiage (Will Incorporate Name Of Surgeon From Intro If Not Blank): operated in two distinct and integrated capacities as the surgeon and pathologist.
Mohs Histo Method Verbiage: Each section was then chromacoded and processed in the Mohs laboratory using the Mohs protocol and submitted for tissue processing with frozen sections prepared in the Mohs laboratory. The entire base and margins of the excised tissue were examined by the surgeon.
Subsequent Stages Histo Method Verbiage: Using a similar technique to that described above, a thin layer of tissue was removed from all areas where tumor was visible on the previous stage.  The tissue was again oriented, mapped, dyed, and processed as above.
Mohs Rapid Report Verbiage: The area of clinically evident tumor was marked with skin marking ink and appropriately hatched.  The initial incision was made following the Mohs approach through the skin.  The specimen was taken to the lab, divided into the necessary number of pieces, chromacoded and processed according to the Mohs protocol.  This was repeated in successive stages until a tumor free defect was achieved.
Complex Repair Preamble Text (Leave Blank If You Do Not Want): The wound edges and deep margins of the Mohs defect were debrided of excess dermis and adipose tissue.  Retention sutures encompassing deep dermal, adipose, and fascial layers were placed to alleviate the tension on the primary suture line and to decrease the potential for wound dehiscence.
Crescentic Complex Repair Preamble Text (Leave Blank If You Do Not Want): Three layered closure, including plication of the fascia underlying the defect, as well as extensive wide undermining were performed.
M-Plasty Complex Repair Preamble Text (Leave Blank If You Do Not Want): Extensive wide undermining was performed.
Crescentic Intermediate Repair Preamble Text (Leave Blank If You Do Not Want): Undermining was performed with blunt dissection.
Non-Graft Cartilage Fenestration Text: The cartilage was fenestrated with a 2mm punch biopsy to help facilitate healing.
Graft Cartilage Fenestration Text: The cartilage was fenestrated with a 2mm punch biopsy to help facilitate graft survival and healing.
Secondary Intention Text (Leave Blank If You Do Not Want): After discussion and consideration of repair and wound management options, the patient opted to allow the defect to heal by second intent.
No Repair - Repaired With Adjacent Surgical Defect Text (Leave Blank If You Do Not Want): After obtaining clear surgical margins the defect was repaired concurrently with another surgical defect which was in close approximation.
Adjacent Tissue Transfer Text: The defect edges were debeveled with a #15 scalpel blade.  Given the location of the defect and the proximity to free margins an adjacent tissue transfer was deemed most appropriate.  Using a sterile surgical marker, an appropriate flap was drawn incorporating the defect and placing the expected incisions within the relaxed skin tension lines where possible.    The area thus outlined was incised deep to adipose tissue with a #15 scalpel blade.  The skin margins were undermined to an appropriate distance in all directions utilizing iris scissors.
Advancement Flap (Single) Text: The defect edges were debeveled with a #15 scalpel blade.  Given the location of the defect and the proximity to free margins a single advancement flap was deemed most appropriate.  Using a sterile surgical marker, an appropriate advancement flap was drawn incorporating the defect and placing the expected incisions within the relaxed skin tension lines where possible.    The area thus outlined was incised deep to adipose tissue with a #15 scalpel blade and carried over to the primary defect.  The skin margins were undermined to an appropriate distance in all directions utilizing iris scissors.
Advancement Flap (Double) Text: The defect edges were debeveled with a #15 scalpel blade.  Given the location of the defect and the proximity to free margins a double advancement flap was deemed most appropriate.  Using a sterile surgical marker, the appropriate advancement flaps were drawn incorporating the defect and placing the expected incisions within the relaxed skin tension lines where possible.    The area thus outlined was incised deep to adipose tissue with a #15 scalpel blade and carried over to the primary defect.  The skin margins were undermined to an appropriate distance in all directions utilizing iris scissors.
Burow's Advancement Flap Text: The defect edges were debeveled with a #15 scalpel blade.  Given the location of the defect and the proximity to free margins a Burow's advancement flap was deemed most appropriate.  Using a sterile surgical marker, the appropriate advancement flap was drawn incorporating the defect and placing the expected incisions within the relaxed skin tension lines where possible.    The area thus outlined was incised deep to adipose tissue with a #15 scalpel blade.  The skin margins were undermined to an appropriate distance in all directions utilizing iris scissors.
Chonodrocutaneous Helical Advancement Flap Text: The defect edges were debeveled with a #15 scalpel blade.  Given the location of the defect and the proximity to free margins a chondrocutaneous helical advancement flap was deemed most appropriate.  Using a sterile surgical marker, the appropriate advancement flap was drawn incorporating the defect and placing the expected incisions within the relaxed skin tension lines where possible.    The area thus outlined was incised deep to adipose tissue with a #15 scalpel blade.  The skin margins were undermined to an appropriate distance in all directions utilizing iris scissors.
Crescentic Advancement Flap Text: The defect edges were debeveled with a #15 scalpel blade.  Given the location of the defect and the proximity to free margins a crescentic advancement flap was deemed most appropriate.  Using a sterile surgical marker, the appropriate advancement flap was drawn incorporating the defect and placing the expected incisions within the relaxed skin tension lines where possible.    The area thus outlined was incised deep to adipose tissue with a #15 scalpel blade.  The skin margins were undermined to an appropriate distance in all directions utilizing iris scissors.
A-T Advancement Flap Text: The defect edges were debeveled with a #15 scalpel blade.  Given the location of the defect, shape of the defect and the proximity to free margins an A-T advancement flap was deemed most appropriate.  Using a sterile surgical marker, an appropriate advancement flap was drawn incorporating the defect and placing the expected incisions within the relaxed skin tension lines where possible.    The area thus outlined was incised deep to adipose tissue with a #15 scalpel blade and carried over to the primary defect.  The skin margins were undermined to an appropriate distance in all directions utilizing iris scissors.
O-T Advancement Flap Text: The defect edges were debeveled with a #15 scalpel blade.  Given the location of the defect, shape of the defect and the proximity to free margins an O-T advancement flap was deemed most appropriate.  Using a sterile surgical marker, an appropriate advancement flap was drawn incorporating the defect and placing the expected incisions within the relaxed skin tension lines where possible.    The area thus outlined was incised deep to adipose tissue with a #15 scalpel blade and carried over to the primary defect.  The skin margins were undermined to an appropriate distance in all directions utilizing iris scissors.
O-L Flap Text: The defect edges were debeveled with a #15 scalpel blade.  Given the location of the defect, shape of the defect and the proximity to free margins an O-L flap was deemed most appropriate.  Using a sterile surgical marker, an appropriate advancement flap was drawn incorporating the defect and placing the expected incisions within the relaxed skin tension lines where possible.    The area thus outlined was incised deep to adipose tissue with a #15 scalpel blade.  The skin margins were undermined to an appropriate distance in all directions utilizing iris scissors.
O-Z Flap Text: The defect edges were debeveled with a #15 scalpel blade.  Given the location of the defect, shape of the defect and the proximity to free margins an O-Z flap was deemed most appropriate.  Using a sterile surgical marker, an appropriate transposition flap was drawn incorporating the defect and placing the expected incisions within the relaxed skin tension lines where possible. The area thus outlined was incised deep to adipose tissue with a #15 scalpel blade and carried over to the primary defect.  The skin margins were undermined to an appropriate distance in all directions utilizing iris scissors.
Double O-Z Flap Text: The defect edges were debeveled with a #15 scalpel blade.  Given the location of the defect, shape of the defect and the proximity to free margins a Double O-Z flap was deemed most appropriate.  Using a sterile surgical marker, an appropriate transposition flap was drawn incorporating the defect and placing the expected incisions within the relaxed skin tension lines where possible. The area thus outlined was incised deep to adipose tissue with a #15 scalpel blade.  The skin margins were undermined to an appropriate distance in all directions utilizing iris scissors.
V-Y Flap Text: The defect edges were debeveled with a #15 scalpel blade.  Given the location of the defect, shape of the defect and the proximity to free margins a V-Y flap was deemed most appropriate.  Using a sterile surgical marker, an appropriate advancement flap was drawn incorporating the defect and placing the expected incisions within the relaxed skin tension lines where possible.    The area thus outlined was incised deep to adipose tissue with a #15 scalpel blade.  The skin margins were undermined to an appropriate distance in all directions utilizing iris scissors.
Advancement-Rotation Flap Text: The defect edges were debeveled with a #15 scalpel blade.  Given the location of the defect, shape of the defect and the proximity to free margins an advancement-rotation flap was deemed most appropriate.  Using a sterile surgical marker, an appropriate flap was drawn incorporating the defect and placing the expected incisions within the relaxed skin tension lines where possible. The area thus outlined was incised deep to adipose tissue with a #15 scalpel blade.  The skin margins were undermined to an appropriate distance in all directions utilizing iris scissors.
Mercedes Flap Text: The defect edges were debeveled with a #15 scalpel blade.  Given the location of the defect, shape of the defect and the proximity to free margins a Mercedes flap was deemed most appropriate.  Using a sterile surgical marker, an appropriate advancement flap was drawn incorporating the defect and placing the expected incisions within the relaxed skin tension lines where possible. The area thus outlined was incised deep to adipose tissue with a #15 scalpel blade and carried over to the primary defect.  The skin margins were undermined to an appropriate distance in all directions utilizing iris scissors.
Modified Advancement Flap Text: The defect edges were debeveled with a #15 scalpel blade.  Given the location of the defect, shape of the defect and the proximity to free margins a three point advancement flap was deemed most appropriate.  Using a sterile surgical marker, an appropriate advancement flap was drawn incorporating the defect and placing the expected incisions within the relaxed skin tension lines where possible.   The area thus outlined was incised deep to adipose tissue with a #15 scalpel blade.  Three standing tissue cones were excised.  The skin margins were undermined to an appropriate distance in all directions utilizing iris scissors.
Mucosal Advancement Flap Text: Given the location of the defect, shape of the defect and the proximity to free margins a mucosal advancement flap was deemed most appropriate. Incisions were made with a 15 blade scalpel in the appropriate fashion along the cutaneous vermilion border and the mucosal lip. The remaining actinically damaged mucosal tissue was excised.  The mucosal advancement flap was then elevated to the gingival sulcus with care taken to preserve the neurovascular structures and advanced into the primary defect. Care was taken to ensure that precise realignment of the vermilion border was achieved.
Peng Advancement Flap Text: The defect edges were debeveled with a #15 scalpel blade.  Given the location of the defect, shape of the defect and the proximity to free margins a Peng advancement flap was deemed most appropriate.  Using a sterile surgical marker, an appropriate advancement flap was drawn incorporating the defect and placing the expected incisions within the relaxed skin tension lines where possible. The area thus outlined was incised deep to adipose tissue with a #15 scalpel blade and carried over to the primary defect.  The skin margins were undermined to an appropriate distance in all directions utilizing iris scissors.
Hatchet Flap Text: The defect edges were debeveled with a #15 scalpel blade.  Given the location of the defect, shape of the defect and the proximity to free margins a hatchet flap was deemed most appropriate.  Using a sterile surgical marker, an appropriate hatchet flap was drawn incorporating the defect and placing the expected incisions within the relaxed skin tension lines where possible.    The area thus outlined was incised deep to adipose tissue with a #15 scalpel blade and carried over to the primary defect.  The skin margins were undermined to an appropriate distance in all directions utilizing iris scissors.
Rotation Flap Text: The defect edges were debeveled with a #15 scalpel blade.  Given the location of the defect, shape of the defect and the proximity to free margins a rotation flap was deemed most appropriate.  Using a sterile surgical marker, an appropriate rotation flap was drawn incorporating the defect and placing the expected incisions within the relaxed skin tension lines where possible.    The area thus outlined was incised deep to adipose tissue with a #15 scalpel blade and carried over to the primary defect.  The skin margins were undermined to an appropriate distance in all directions utilizing iris scissors.
Bilateral Rotation Flap Text: The defect edges were debeveled with a #15 scalpel blade. Given the location of the defect, shape of the defect and the proximity to free margins a bilateral rotation flap was deemed most appropriate. Using a sterile surgical marker, an appropriate rotation flap was drawn incorporating the defect and placing the expected incisions within the relaxed skin tension lines where possible. The area thus outlined was incised deep to adipose tissue with a #15 scalpel blade. The skin margins were undermined to an appropriate distance in all directions utilizing iris scissors. Following this, the designed flap was carried over into the primary defect and sutured into place.
Spiral Flap Text: The defect edges were debeveled with a #15 scalpel blade.  Given the location of the defect, shape of the defect and the proximity to free margins a spiral flap was deemed most appropriate.  Using a sterile surgical marker, an appropriate rotation flap was drawn incorporating the defect and placing the expected incisions within the relaxed skin tension lines where possible. The area thus outlined was incised deep to adipose tissue with a #15 scalpel blade and carried over to the primary defect.  The skin margins were undermined to an appropriate distance in all directions utilizing iris scissors.
Staged Advancement Flap Text: The defect edges were debeveled with a #15 scalpel blade.  Given the location of the defect, shape of the defect and the proximity to free margins a staged advancement flap was deemed most appropriate.  Using a sterile surgical marker, an appropriate advancement flap was drawn incorporating the defect and placing the expected incisions within the relaxed skin tension lines where possible. The area thus outlined was incised deep to adipose tissue with a #15 scalpel blade.  The skin margins were undermined to an appropriate distance in all directions utilizing iris scissors.
Star Wedge Flap Text: The defect edges were debeveled with a #15 scalpel blade.  Given the location of the defect, shape of the defect and the proximity to free margins a star wedge flap was deemed most appropriate.  Using a sterile surgical marker, an appropriate rotation flap was drawn incorporating the defect and placing the expected incisions within the relaxed skin tension lines where possible. The area thus outlined was incised deep to adipose tissue with a #15 scalpel blade.  The skin margins were undermined to an appropriate distance in all directions utilizing iris scissors.
Transposition Flap Text: The defect edges were debeveled with a #15 scalpel blade.  Given the location of the defect and the proximity to free margins a transposition flap was deemed most appropriate.  Using a sterile surgical marker, an appropriate transposition flap was drawn incorporating the defect.    The area thus outlined was incised deep to adipose tissue with a #15 scalpel blade and carried over to the primary defect.  The skin margins were undermined to an appropriate distance in all directions utilizing iris scissors.
Muscle Hinge Flap Text: The defect edges were debeveled with a #15 scalpel blade.  Given the size, depth and location of the defect and the proximity to free margins a muscle hinge flap was deemed most appropriate.  Using a sterile surgical marker, an appropriate hinge flap was drawn incorporating the defect. The area thus outlined was incised with a #15 scalpel blade.  The skin margins were undermined to an appropriate distance in all directions utilizing iris scissors.
Mustarde Flap Text: The defect edges were debeveled with a #15 scalpel blade.  Given the size, depth and location of the defect and the proximity to free margins a Mustarde flap was deemed most appropriate.  Using a sterile surgical marker, an appropriate flap was drawn incorporating the defect. The area thus outlined was incised with a #15 scalpel blade and carried over to the primary defect.  The skin margins were undermined to an appropriate distance in all directions utilizing iris scissors.
Nasal Turnover Hinge Flap Text: The defect edges were debeveled with a #15 scalpel blade.  Given the size, depth, location of the defect and the defect being full thickness a nasal turnover hinge flap was deemed most appropriate.  Using a sterile surgical marker, an appropriate hinge flap was drawn incorporating the defect. The area thus outlined was incised with a #15 scalpel blade and carried over to the primary defect. The flap was designed to recreate the nasal mucosal lining and the alar rim. The skin margins were undermined to an appropriate distance in all directions utilizing iris scissors.
Nasalis-Muscle-Based Myocutaneous Island Pedicle Flap Text: Using a #15 blade, an incision was made around the donor flap to the level of the nasalis muscle. Wide lateral undermining was then performed in both the subcutaneous plane above the nasalis muscle, and in a submuscular plane just above periosteum. This allowed the formation of a free nasalis muscle axial pedicle (based on the angular artery) which was still attached to the actual cutaneous flap, increasing its mobility and vascular viability. Hemostasis was obtained with pinpoint electrocoagulation. The flap was mobilized into position and the pivotal anchor points positioned and stabilized with buried interrupted sutures. Subcutaneous and dermal tissues were closed in a multilayered fashion with sutures. Tissue redundancies were excised, and the epidermal edges were apposed without significant tension and sutured with sutures.
Nasalis Myocutaneous Flap Text: Using a #15 blade, an incision was made around the donor flap to the level of the nasalis muscle. Wide lateral undermining was then performed in both the subcutaneous plane above the nasalis muscle, and in a submuscular plane just above periosteum. This allowed the formation of a free nasalis muscle axial pedicle which was still attached to the actual cutaneous flap, increasing its mobility and vascular viability. Hemostasis was obtained with pinpoint electrocoagulation. The flap was mobilized into position and the pivotal anchor points positioned and stabilized with buried interrupted sutures. Subcutaneous and dermal tissues were closed in a multilayered fashion with sutures. Tissue redundancies were excised, and the epidermal edges were apposed without significant tension and sutured with sutures.
Nasolabial Transposition Flap Text: The defect edges were debeveled with a #15 scalpel blade.  Given the size, depth and location of the defect and the proximity to free margins a nasolabial transposition flap was deemed most appropriate. Using a sterile surgical marker, an appropriate flap was drawn incorporating the defect. The area thus outlined was incised with a #15 scalpel blade. The skin margins were undermined to an appropriate distance in all directions utilizing iris scissors. Following this, the designed flap was carried into the primary defect and sutured into place.
Orbicularis Oris Muscle Flap Text: The defect edges were debeveled with a #15 scalpel blade.  Given that the defect affected the competency of the oral sphincter an orbicularis oris muscle flap was deemed most appropriate to restore this competency and normal muscle function.  Using a sterile surgical marker, an appropriate flap was drawn incorporating the defect. The area thus outlined was incised with a #15 scalpel blade.
Melolabial Transposition Flap Text: The defect edges were debeveled with a #15 scalpel blade.  Given the location of the defect and the proximity to free margins a melolabial flap was deemed most appropriate.  Using a sterile surgical marker, an appropriate melolabial transposition flap was drawn incorporating the defect.    The area thus outlined was incised deep to adipose tissue with a #15 scalpel blade and carried over to the primary defect.  The skin margins were undermined to an appropriate distance in all directions utilizing iris scissors.
Rectangular Flap Text: The defect edges were debeveled with a #15 scalpel blade. Given the location of the defect and the proximity to free margins a rectangular flap was deemed most appropriate. Using a sterile surgical marker, an appropriate rectangular flap was drawn incorporating the defect. The area thus outlined was incised deep to adipose tissue with a #15 scalpel blade. The skin margins were undermined to an appropriate distance in all directions utilizing iris scissors. Following this, the designed flap was carried over into the primary defect and sutured into place.
Rhombic Flap Text: The defect edges were debeveled with a #15 scalpel blade.  Given the location of the defect and the proximity to free margins a rhombic flap was deemed most appropriate.  Using a sterile surgical marker, an appropriate rhombic flap was drawn incorporating the defect.    The area thus outlined was incised deep to adipose tissue with a #15 scalpel blade and carried over to the primary defect.  The skin margins were undermined to an appropriate distance in all directions utilizing iris scissors.
Rhomboid Transposition Flap Text: The defect edges were debeveled with a #15 scalpel blade.  Given the location of the defect and the proximity to free margins a rhomboid transposition flap was deemed most appropriate.  Using a sterile surgical marker, an appropriate rhomboid flap was drawn incorporating the defect.    The area thus outlined was incised deep to adipose tissue with a #15 scalpel blade.  The skin margins were undermined to an appropriate distance in all directions utilizing iris scissors.
Bi-Rhombic Flap Text: The defect edges were debeveled with a #15 scalpel blade.  Given the location of the defect and the proximity to free margins a bi-rhombic flap was deemed most appropriate.  Using a sterile surgical marker, an appropriate rhombic flap was drawn incorporating the defect. The area thus outlined was incised deep to adipose tissue with a #15 scalpel blade.  The skin margins were undermined to an appropriate distance in all directions utilizing iris scissors.
Helical Rim Advancement Flap Text: The defect edges were debeveled with a #15 blade scalpel.  Given the location of the defect and the proximity to free margins (helical rim) a double helical rim advancement flap was deemed most appropriate.  Using a sterile surgical marker, the appropriate advancement flaps were drawn incorporating the defect and placing the expected incisions between the helical rim and antihelix where possible.  The area thus outlined was incised through and through with a #15 scalpel blade and carried over to the primary defect.  With a skin hook and iris scissors, the flaps were gently and sharply undermined and freed up.
Bilateral Helical Rim Advancement Flap Text: The defect edges were debeveled with a #15 blade scalpel.  Given the location of the defect and the proximity to free margins (helical rim) a bilateral helical rim advancement flap was deemed most appropriate.  Using a sterile surgical marker, the appropriate advancement flaps were drawn incorporating the defect and placing the expected incisions between the helical rim and antihelix where possible.  The area thus outlined was incised through and through with a #15 scalpel blade.  With a skin hook and iris scissors, the flaps were gently and sharply undermined and freed up.
Ear Star Wedge Flap Text: The defect edges were debeveled with a #15 blade scalpel.  Given the location of the defect and the proximity to free margins (helical rim) an ear star wedge flap was deemed most appropriate.  Using a sterile surgical marker, the appropriate flap was drawn incorporating the defect and placing the expected incisions between the helical rim and antihelix where possible.  The area thus outlined was incised through and through with a #15 scalpel blade.
Banner Transposition Flap Text: The defect edges were debeveled with a #15 scalpel blade.  Given the location of the defect and the proximity to free margins a Banner transposition flap was deemed most appropriate.  Using a sterile surgical marker, an appropriate flap drawn around the defect. The area thus outlined was incised deep to adipose tissue with a #15 scalpel blade.  The skin margins were undermined to an appropriate distance in all directions utilizing iris scissors.
Bilobed Flap Text: The defect edges were debeveled with a #15 scalpel blade.  Given the location of the defect and the proximity to free margins a bilobe flap was deemed most appropriate.  Using a sterile surgical marker, an appropriate bilobed flap drawn around the defect.    The area thus outlined was incised deep to adipose tissue with a #15 scalpel blade and carried over to the primary defect.  The skin margins were undermined to an appropriate distance in all directions utilizing iris scissors.
Bilobed Transposition Flap Text: The defect edges were debeveled with a #15 scalpel blade.  Given the location of the defect and the proximity to free margins a bilobed transposition flap was deemed most appropriate.  Using a sterile surgical marker, an appropriate bilobe flap drawn around the defect.    The area thus outlined was incised deep to adipose tissue with a #15 scalpel blade and carried over to the primary defect.  The skin margins were undermined to an appropriate distance in all directions utilizing iris scissors.
Trilobed Flap Text: The defect edges were debeveled with a #15 scalpel blade.  Given the location of the defect and the proximity to free margins a trilobed flap was deemed most appropriate.  Using a sterile surgical marker, an appropriate trilobed flap drawn around the defect.    The area thus outlined was incised deep to adipose tissue with a #15 scalpel blade and carried over to the primary defect.  The skin margins were undermined to an appropriate distance in all directions utilizing iris scissors.
Dorsal Nasal Flap Text: The defect edges were debeveled with a #15 scalpel blade.  Given the location of the defect and the proximity to free margins a dorsal nasal flap was deemed most appropriate.  Using a sterile surgical marker, an appropriate dorsal nasal flap was drawn around the defect.    The area thus outlined was incised deep to adipose tissue with a #15 scalpel blade and carried over to the primary defect.  The skin margins were undermined to an appropriate distance in all directions utilizing iris scissors.
Island Pedicle Flap Text: The defect edges were debeveled with a #15 scalpel blade.  Given the location of the defect, shape of the defect and the proximity to free margins an island pedicle advancement flap was deemed most appropriate.  Using a sterile surgical marker, an appropriate advancement flap was drawn incorporating the defect, outlining the appropriate donor tissue and placing the expected incisions within the relaxed skin tension lines where possible.    The area thus outlined was incised deep to adipose tissue with a #15 scalpel blade and carried over to the primary defect.  The skin margins were undermined to an appropriate distance in all directions around the primary defect and laterally outward around the island pedicle utilizing iris scissors.  There was minimal undermining beneath the pedicle flap.
Island Pedicle Flap With Canthal Suspension Text: The defect edges were debeveled with a #15 scalpel blade.  Given the location of the defect, shape of the defect and the proximity to free margins an island pedicle advancement flap was deemed most appropriate.  Using a sterile surgical marker, an appropriate advancement flap was drawn incorporating the defect, outlining the appropriate donor tissue and placing the expected incisions within the relaxed skin tension lines where possible. The area thus outlined was incised deep to adipose tissue with a #15 scalpel blade.  The skin margins were undermined to an appropriate distance in all directions around the primary defect and laterally outward around the island pedicle utilizing iris scissors.  There was minimal undermining beneath the pedicle flap. A suspension suture was placed in the canthal tendon to prevent tension and prevent ectropion.
Alar Island Pedicle Flap Text: The defect edges were debeveled with a #15 scalpel blade.  Given the location of the defect, shape of the defect and the proximity to the alar rim an island pedicle advancement flap was deemed most appropriate.  Using a sterile surgical marker, an appropriate advancement flap was drawn incorporating the defect, outlining the appropriate donor tissue and placing the expected incisions within the nasal ala running parallel to the alar rim. The area thus outlined was incised with a #15 scalpel blade and carried over to the primary defect.  The skin margins were undermined minimally to an appropriate distance in all directions around the primary defect and laterally outward around the island pedicle utilizing iris scissors.  There was minimal undermining beneath the pedicle flap.
Double Island Pedicle Flap Text: The defect edges were debeveled with a #15 scalpel blade.  Given the location of the defect, shape of the defect and the proximity to free margins a double island pedicle advancement flap was deemed most appropriate.  Using a sterile surgical marker, an appropriate advancement flap was drawn incorporating the defect, outlining the appropriate donor tissue and placing the expected incisions within the relaxed skin tension lines where possible.    The area thus outlined was incised deep to adipose tissue with a #15 scalpel blade and carried over to the primary defect.  The skin margins were undermined to an appropriate distance in all directions around the primary defect and laterally outward around the island pedicle utilizing iris scissors.  There was minimal undermining beneath the pedicle flap.
Island Pedicle Flap-Requiring Vessel Identification Text: The defect edges were debeveled with a #15 scalpel blade.  Given the location of the defect, shape of the defect and the proximity to free margins an island pedicle advancement flap was deemed most appropriate.  Using a sterile surgical marker, an appropriate advancement flap was drawn, based on the axial vessel mentioned above, incorporating the defect, outlining the appropriate donor tissue and placing the expected incisions within the relaxed skin tension lines where possible.    The area thus outlined was incised deep to adipose tissue with a #15 scalpel blade.  The skin margins were undermined to an appropriate distance in all directions around the primary defect and laterally outward around the island pedicle utilizing iris scissors.  There was minimal undermining beneath the pedicle flap.
Keystone Flap Text: The defect edges were debeveled with a #15 scalpel blade.  Given the location of the defect, shape of the defect a keystone flap was deemed most appropriate.  Using a sterile surgical marker, an appropriate keystone flap was drawn incorporating the defect, outlining the appropriate donor tissue and placing the expected incisions within the relaxed skin tension lines where possible. The area thus outlined was incised deep to adipose tissue with a #15 scalpel blade and carried over to the primary defect.  The skin margins were undermined to an appropriate distance in all directions around the primary defect and laterally outward around the flap utilizing iris scissors.
O-T Plasty Text: The defect edges were debeveled with a #15 scalpel blade.  Given the location of the defect, shape of the defect and the proximity to free margins an O-T plasty was deemed most appropriate.  Using a sterile surgical marker, an appropriate O-T plasty was drawn incorporating the defect and placing the expected incisions within the relaxed skin tension lines where possible.    The area thus outlined was incised deep to adipose tissue with a #15 scalpel blade.  The skin margins were undermined to an appropriate distance in all directions utilizing iris scissors.
O-Z Plasty Text: The defect edges were debeveled with a #15 scalpel blade.  Given the location of the defect, shape of the defect and the proximity to free margins an O-Z plasty (double transposition flap) was deemed most appropriate.  Using a sterile surgical marker, the appropriate transposition flaps were drawn incorporating the defect and placing the expected incisions within the relaxed skin tension lines where possible.    The area thus outlined was incised deep to adipose tissue with a #15 scalpel blade.  The skin margins were undermined to an appropriate distance in all directions utilizing iris scissors.  Hemostasis was achieved with electrocautery.  The flaps were then transposed into place, one clockwise and the other counterclockwise, and anchored with interrupted buried subcutaneous sutures.
Double O-Z Plasty Text: The defect edges were debeveled with a #15 scalpel blade.  Given the location of the defect, shape of the defect and the proximity to free margins a Double O-Z plasty (double transposition flap) was deemed most appropriate.  Using a sterile surgical marker, the appropriate transposition flaps were drawn incorporating the defect and placing the expected incisions within the relaxed skin tension lines where possible. The area thus outlined was incised deep to adipose tissue with a #15 scalpel blade.  The skin margins were undermined to an appropriate distance in all directions utilizing iris scissors.  Hemostasis was achieved with electrocautery.  The flaps were then transposed into place, one clockwise and the other counterclockwise, and anchored with interrupted buried subcutaneous sutures.
V-Y Plasty Text: The defect edges were debeveled with a #15 scalpel blade.  Given the location of the defect, shape of the defect and the proximity to free margins an V-Y advancement flap was deemed most appropriate.  Using a sterile surgical marker, an appropriate advancement flap was drawn incorporating the defect and placing the expected incisions within the relaxed skin tension lines where possible.    The area thus outlined was incised deep to adipose tissue with a #15 scalpel blade.  The skin margins were undermined to an appropriate distance in all directions utilizing iris scissors.
H Plasty Text: Given the location of the defect, shape of the defect and the proximity to free margins a H-plasty was deemed most appropriate for repair.  Using a sterile surgical marker, the appropriate advancement arms of the H-plasty were drawn incorporating the defect and placing the expected incisions within the relaxed skin tension lines where possible. The area thus outlined was incised deep to adipose tissue with a #15 scalpel blade. The skin margins were undermined to an appropriate distance in all directions utilizing iris scissors.  The opposing advancement arms were then advanced into place in opposite direction and anchored with interrupted buried subcutaneous sutures.
W Plasty Text: The lesion was extirpated to the level of the fat with a #15 scalpel blade.  Given the location of the defect, shape of the defect and the proximity to free margins a W-plasty was deemed most appropriate for repair.  Using a sterile surgical marker, the appropriate transposition arms of the W-plasty were drawn incorporating the defect and placing the expected incisions within the relaxed skin tension lines where possible.    The area thus outlined was incised deep to adipose tissue with a #15 scalpel blade.  The skin margins were undermined to an appropriate distance in all directions utilizing iris scissors.  The opposing transposition arms were then transposed into place in opposite direction and anchored with interrupted buried subcutaneous sutures.
Z Plasty Text: The lesion was extirpated to the level of the fat with a #15 scalpel blade.  Given the location of the defect, shape of the defect and the proximity to free margins a Z-plasty was deemed most appropriate for repair.  Using a sterile surgical marker, the appropriate transposition arms of the Z-plasty were drawn incorporating the defect and placing the expected incisions within the relaxed skin tension lines where possible.    The area thus outlined was incised deep to adipose tissue with a #15 scalpel blade.  The skin margins were undermined to an appropriate distance in all directions utilizing iris scissors.  The opposing transposition arms were then transposed into place in opposite direction and anchored with interrupted buried subcutaneous sutures.
Double Z Plasty Text: The lesion was extirpated to the level of the fat with a #15 scalpel blade. Given the location of the defect, shape of the defect and the proximity to free margins a double Z-plasty was deemed most appropriate for repair. Using a sterile surgical marker, the appropriate transposition arms of the double Z-plasty were drawn incorporating the defect and placing the expected incisions within the relaxed skin tension lines where possible. The area thus outlined was incised deep to adipose tissue with a #15 scalpel blade. The skin margins were undermined to an appropriate distance in all directions utilizing iris scissors. The opposing transposition arms were then transposed and carried over into place in opposite direction and anchored with interrupted buried subcutaneous sutures.
Zygomaticofacial Flap Text: Given the location of the defect, shape of the defect and the proximity to free margins a zygomaticofacial flap was deemed most appropriate for repair.  Using a sterile surgical marker, the appropriate flap was drawn incorporating the defect and placing the expected incisions within the relaxed skin tension lines where possible. The area thus outlined was incised deep to adipose tissue with a #15 scalpel blade with preservation of a vascular pedicle.  The skin margins were undermined to an appropriate distance in all directions utilizing iris scissors.  The flap was then placed into the defect and anchored with interrupted buried subcutaneous sutures.
Cheek Interpolation Flap Text: A decision was made to reconstruct the defect utilizing an interpolation axial flap and a staged reconstruction.  A telfa template was made of the defect.  This telfa template was then used to outline the Cheek Interpolation flap.  The donor area for the pedicle flap was then injected with anesthesia.  The flap was excised through the skin and subcutaneous tissue down to the layer of the underlying musculature.  The interpolation flap was carefully excised within this deep plane to maintain its blood supply.  The edges of the donor site were undermined.   The donor site was closed in a primary fashion.  The pedicle was then carried over to the primary defect, rotated into position and sutured.  Once the tube was sutured into place, adequate blood supply was confirmed with blanching and refill.  The pedicle was then wrapped with xeroform gauze and dressed appropriately with a telfa and gauze bandage to ensure continued blood supply and protect the attached pedicle.
Cheek-To-Nose Interpolation Flap Text: A decision was made to reconstruct the defect utilizing an interpolation axial flap and a staged reconstruction.  A telfa template was made of the defect.  This telfa template was then used to outline the Cheek-To-Nose Interpolation flap.  The donor area for the pedicle flap was then injected with anesthesia.  The flap was excised through the skin and subcutaneous tissue down to the layer of the underlying musculature.  The interpolation flap was carefully excised within this deep plane to maintain its blood supply.  The edges of the donor site were undermined.   The donor site was closed in a primary fashion.  The pedicle was then and carried over to the primary defect, rotated into position and sutured.  Once the tube was sutured into place, adequate blood supply was confirmed with blanching and refill.  The pedicle was then wrapped with xeroform gauze and dressed appropriately with a telfa and gauze bandage to ensure continued blood supply and protect the attached pedicle.
Interpolation Flap Text: A decision was made to reconstruct the defect utilizing an interpolation axial flap and a staged reconstruction.  A telfa template was made of the defect.  This telfa template was then used to outline the interpolation flap.  The donor area for the pedicle flap was then injected with anesthesia.  The flap was excised through the skin and subcutaneous tissue down to the layer of the underlying musculature.  The interpolation flap was carefully excised within this deep plane to maintain its blood supply.  The edges of the donor site were undermined.   The donor site was closed in a primary fashion.  The pedicle was then and carried over to the primary defect, rotated into position and sutured.  Once the tube was sutured into place, adequate blood supply was confirmed with blanching and refill.  The pedicle was then wrapped with xeroform gauze and dressed appropriately with a telfa and gauze bandage to ensure continued blood supply and protect the attached pedicle.
Melolabial Interpolation Flap Text: A decision was made to reconstruct the defect utilizing an interpolation axial flap and a staged reconstruction.  A telfa template was made of the defect.  This telfa template was then used to outline the melolabial interpolation flap.  The donor area for the pedicle flap was then injected with anesthesia.  The flap was excised through the skin and subcutaneous tissue down to the layer of the underlying musculature.  The pedicle flap was carefully excised within this deep plane to maintain its blood supply.  The edges of the donor site were undermined.   The donor site was closed in a primary fashion.  The pedicle was then carried over to the primary defect, rotated into position and sutured.  Once the tube was sutured into place, adequate blood supply was confirmed with blanching and refill.  The pedicle was then wrapped with xeroform gauze and dressed appropriately with a telfa and gauze bandage to ensure continued blood supply and protect the attached pedicle.
Mastoid Interpolation Flap Text: A decision was made to reconstruct the defect utilizing an interpolation axial flap and a staged reconstruction.  A telfa template was made of the defect.  This telfa template was then used to outline the mastoid interpolation flap.  The donor area for the pedicle flap was then injected with anesthesia.  The flap was excised through the skin and subcutaneous tissue down to the layer of the underlying musculature.  The pedicle flap was carefully excised within this deep plane to maintain its blood supply.  The edges of the donor site were undermined.   The donor site was closed in a primary fashion.  The pedicle was then and carried over to the primary defect, rotated into position and sutured.  Once the tube was sutured into place, adequate blood supply was confirmed with blanching and refill.  The pedicle was then wrapped with xeroform gauze and dressed appropriately with a telfa and gauze bandage to ensure continued blood supply and protect the attached pedicle.
Posterior Auricular Interpolation Flap Text: A decision was made to reconstruct the defect utilizing an interpolation axial flap and a staged reconstruction.  A telfa template was made of the defect.  This telfa template was then used to outline the posterior auricular interpolation flap.  The donor area for the pedicle flap was then injected with anesthesia.  The flap was excised through the skin and subcutaneous tissue down to the layer of the underlying musculature.  The pedicle flap was carefully excised within this deep plane to maintain its blood supply.  The edges of the donor site were undermined.   The donor site was closed in a primary fashion.  The pedicle was then rotated into position and sutured.  Once the tube was sutured into place, adequate blood supply was confirmed with blanching and refill.  The pedicle was then wrapped with xeroform gauze and dressed appropriately with a telfa and gauze bandage to ensure continued blood supply and protect the attached pedicle.
Paramedian Forehead Flap Text: A decision was made to reconstruct the defect utilizing an interpolation axial flap and a staged reconstruction.  A telfa template was made of the defect.  This telfa template was then used to outline the paramedian forehead pedicle flap.  The donor area for the pedicle flap was then injected with anesthesia.  The flap was excised through the skin and subcutaneous tissue down to the layer of the underlying musculature.  The pedicle flap was carefully excised within this deep plane to maintain its blood supply.  The edges of the donor site were undermined.   The donor site was closed in a primary fashion.  The pedicle was then rotated into position and sutured.  Once the tube was sutured into place, adequate blood supply was confirmed with blanching and refill.  The pedicle was then wrapped with xeroform gauze and dressed appropriately with a telfa and gauze bandage to ensure continued blood supply and protect the attached pedicle.
Abbe Flap (Upper To Lower Lip) Text: The defect of the lower lip was assessed and measured.  Given the location and size of the defect, an Abbe flap was deemed most appropriate.  Using a sterile surgical marker, an appropriate Abbe flap was measured and drawn on the upper lip. Local anesthesia was then infiltrated.  A scalpel was then used to incise the upper lip through and through the skin, vermilion, muscle and mucosa, leaving the flap pedicled on the opposite side.  The flap was then rotated and transferred to the lower lip defect.  The flap was then sutured into place with a three layer technique, closing the orbicularis oris muscle layer with subcutaneous buried sutures, followed by a mucosal layer and an epidermal layer.
Abbe Flap (Lower To Upper Lip) Text: The defect of the upper lip was assessed and measured.  Given the location and size of the defect, an Abbe flap was deemed most appropriate.  Using a sterile surgical marker, an appropriate Abbe flap was measured and drawn on the lower lip. Local anesthesia was then infiltrated. A scalpel was then used to incise the upper lip through and through the skin, vermilion, muscle and mucosa, leaving the flap pedicled on the opposite side.  The flap was then rotated and transferred to the lower lip defect.  The flap was then sutured into place with a three layer technique, closing the orbicularis oris muscle layer with subcutaneous buried sutures, followed by a mucosal layer and an epidermal layer.
Estlander Flap (Upper To Lower Lip) Text: The defect of the lower lip was assessed and measured.  Given the location and size of the defect, an Estlander flap was deemed most appropriate.  Using a sterile surgical marker, an appropriate Estlander flap was measured and drawn on the upper lip. Local anesthesia was then infiltrated. A scalpel was then used to incise the lateral aspect of the flap, through skin, muscle and mucosa, leaving the flap pedicled medially.  The flap was then rotated and positioned to fill the lower lip defect.  The flap was then sutured into place with a three layer technique, closing the orbicularis oris muscle layer with subcutaneous buried sutures, followed by a mucosal layer and an epidermal layer.
Cheiloplasty (Less Than 50%) Text: A decision was made to reconstruct the defect with a  cheiloplasty.  The defect was undermined extensively.  Additional obicularis oris muscle was excised with a 15 blade scalpel.  The defect was converted into a full thickness wedge, of less than 50% of the vertical height of the lip, to facilite a better cosmetic result.  Small vessels were then tied off with 5-0 monocyrl. The obicularis oris, superficial fascia, adipose and dermis were then reapproximated.  After the deeper layers were approximated the epidermis was reapproximated with particular care given to realign the vermilion border.
Cheiloplasty (Complex) Text: A decision was made to reconstruct the defect with a  cheiloplasty.  The defect was undermined extensively.  Additional obicularis oris muscle was excised with a 15 blade scalpel.  The defect was converted into a full thickness wedge to facilite a better cosmetic result.  Small vessels were then tied off with 5-0 monocyrl. The obicularis oris, superficial fascia, adipose and dermis were then reapproximated.  After the deeper layers were approximated the epidermis was reapproximated with particular care given to realign the vermilion border.
Ear Wedge Repair Text: A wedge excision was completed by carrying down an excision through the full thickness of the ear and cartilage with an inward facing Burow's triangle. The wound was then closed in a layered fashion.
Full Thickness Lip Wedge Repair (Flap) Text: Given the location of the defect and the proximity to free margins a full thickness wedge repair was deemed most appropriate.  Using a sterile surgical marker, the appropriate repair was drawn incorporating the defect and placing the expected incisions perpendicular to the vermilion border.  The vermilion border was also meticulously outlined to ensure appropriate reapproximation during the repair.  The area thus outlined was incised through and through with a #15 scalpel blade.  The muscularis and dermis were reaproximated with deep sutures following hemostasis. Care was taken to realign the vermilion border before proceeding with the superficial closure.  Once the vermilion was realigned the superfical and mucosal closure was finished.
Ftsg Text: The defect edges were debeveled with a #15 scalpel blade.  Given the location of the defect, shape of the defect and the proximity to free margins a full thickness skin graft was deemed most appropriate.  Using a sterile surgical marker, the primary defect shape was transferred to the donor site. The area thus outlined was incised deep to adipose tissue with a #15 scalpel blade.  The harvested graft was then trimmed of adipose tissue until only dermis and epidermis was left.  The skin margins of the secondary defect were undermined to an appropriate distance in all directions utilizing iris scissors.  The secondary defect was closed with interrupted buried subcutaneous sutures.  The skin edges were then re-apposed with running  sutures.  The skin graft was then placed in the primary defect and oriented appropriately.
Split-Thickness Skin Graft Text: The defect edges were debeveled with a #15 scalpel blade.  Given the location of the defect, shape of the defect and the proximity to free margins a split thickness skin graft was deemed most appropriate.  Using a sterile surgical marker, the primary defect shape was transferred to the donor site. The split thickness graft was then harvested.  The skin graft was then placed in the primary defect and oriented appropriately.
Pinch Graft Text: The defect edges were debeveled with a #15 scalpel blade. Given the location of the defect, shape of the defect and the proximity to free margins a pinch graft was deemed most appropriate. Using a sterile surgical marker, the primary defect shape was transferred to the donor site. The area thus outlined was incised deep to adipose tissue with a #15 scalpel blade.  The harvested graft was then trimmed of adipose tissue until only dermis and epidermis was left. The skin margins of the secondary defect were undermined to an appropriate distance in all directions utilizing iris scissors.  The secondary defect was closed with interrupted buried subcutaneous sutures.  The skin edges were then re-apposed with running  sutures.  The skin graft was then placed in the primary defect and oriented appropriately.
Burow's Graft Text: The defect edges were debeveled with a #15 scalpel blade.  Given the location of the defect, shape of the defect, the proximity to free margins and the presence of a standing cone deformity a Burow's skin graft was deemed most appropriate. The standing cone was removed and this tissue was then trimmed to the shape of the primary defect. The adipose tissue was also removed until only dermis and epidermis were left.  The skin margins of the secondary defect were undermined to an appropriate distance in all directions utilizing iris scissors.  The secondary defect was closed with interrupted buried subcutaneous sutures.  The skin edges were then re-apposed with running  sutures.  The skin graft was then placed in the primary defect and oriented appropriately.
Cartilage Graft Text: The defect edges were debeveled with a #15 scalpel blade.  Given the location of the defect, shape of the defect, the fact the defect involved a full thickness cartilage defect a cartilage graft was deemed most appropriate.  An appropriate donor site was identified, cleansed, and anesthetized. The cartilage graft was then harvested and transferred to the recipient site, oriented appropriately and then sutured into place.  The secondary defect was then repaired using a primary closure.
Composite Graft Text: The defect edges were debeveled with a #15 scalpel blade.  Given the location of the defect, shape of the defect, the proximity to free margins and the fact the defect was full thickness a composite graft was deemed most appropriate.  The defect was outline and then transferred to the donor site.  A full thickness graft was then excised from the donor site. The graft was then placed in the primary defect, oriented appropriately and then sutured into place.  The secondary defect was then repaired using a primary closure.
Epidermal Autograft Text: The defect edges were debeveled with a #15 scalpel blade.  Given the location of the defect, shape of the defect and the proximity to free margins an epidermal autograft was deemed most appropriate.  Using a sterile surgical marker, the primary defect shape was transferred to the donor site. The epidermal graft was then harvested.  The skin graft was then placed in the primary defect and oriented appropriately.
Dermal Autograft Text: The defect edges were debeveled with a #15 scalpel blade.  Given the location of the defect, shape of the defect and the proximity to free margins a dermal autograft was deemed most appropriate.  Using a sterile surgical marker, the primary defect shape was transferred to the donor site. The area thus outlined was incised deep to adipose tissue with a #15 scalpel blade.  The harvested graft was then trimmed of adipose and epidermal tissue until only dermis was left.  The skin graft was then placed in the primary defect and oriented appropriately.
Skin Substitute Text: The defect edges were debeveled with a #15 scalpel blade.  Given the location of the defect, shape of the defect and the proximity to free margins a skin substitute graft was deemed most appropriate.  The graft material was trimmed to fit the size of the defect. The graft was then placed in the primary defect and oriented appropriately.
Tissue Cultured Epidermal Autograft Text: The defect edges were debeveled with a #15 scalpel blade.  Given the location of the defect, shape of the defect and the proximity to free margins a tissue cultured epidermal autograft was deemed most appropriate.  The graft was then trimmed to fit the size of the defect.  The graft was then placed in the primary defect and oriented appropriately.
Xenograft Text: The defect edges were debeveled with a #15 scalpel blade.  Given the location of the defect, shape of the defect and the proximity to free margins a xenograft was deemed most appropriate.  The graft was then trimmed to fit the size of the defect.  The graft was then placed in the primary defect and oriented appropriately.
Purse String (Simple) Text: Given the location of the defect and the characteristics of the surrounding skin a purse string closure was deemed most appropriate.  Undermining was performed circumfirentially around the surgical defect.  A purse string suture was then placed and tightened.
Purse String (Intermediate) Text: Given the location of the defect and the characteristics of the surrounding skin a purse string intermediate closure was deemed most appropriate.  Undermining was performed circumfirentially around the surgical defect.  A purse string suture was then placed and tightened.
Partial Purse String (Simple) Text: Given the location of the defect and the characteristics of the surrounding skin a simple purse string closure was deemed most appropriate.  Undermining was performed circumfirentially around the surgical defect.  A purse string suture was then placed and tightened. Wound tension only allowed a partial closure of the circular defect.
Partial Purse String (Intermediate) Text: Given the location of the defect and the characteristics of the surrounding skin an intermediate purse string closure was deemed most appropriate.  Undermining was performed circumfirentially around the surgical defect.  A purse string suture was then placed and tightened. Wound tension only allowed a partial closure of the circular defect.
Localized Dermabrasion With Wire Brush Text: The patient was draped in routine manner.  Localized dermabrasion using 3 x 17 mm wire brush was performed in routine manner to papillary dermis. This spot dermabrasion is being performed to complete skin cancer reconstruction. It also will eliminate the other sun damaged precancerous cells that are known to be part of the regional effect of a lifetime's worth of sun exposure. This localized dermabrasion is therapeutic and should not be considered cosmetic in any regard.
Localized Dermabrasion With Sand Papertext: The patient was draped in routine manner.  Localized dermabrasion using sterile sand paper was performed in routine manner to papillary dermis. This spot dermabrasion is being performed to complete skin cancer reconstruction. It also will eliminate the other sun damaged precancerous cells that are known to be part of the regional effect of a lifetime's worth of sun exposure. This localized dermabrasion is therapeutic and should not be considered cosmetic in any regard.
Localized Dermabrasion With 15 Blade Text: The patient was draped in routine manner.  Localized dermabrasion using a 15 blade was performed in routine manner to papillary dermis. This spot dermabrasion is being performed to complete skin cancer reconstruction. It also will eliminate the other sun damaged precancerous cells that are known to be part of the regional effect of a lifetime's worth of sun exposure. This localized dermabrasion is therapeutic and should not be considered cosmetic in any regard.
Tarsorrhaphy Text: A tarsorrhaphy was performed using Frost sutures.
Intermediate Repair And Flap Additional Text (Will Appearing After The Standard Complex Repair Text): The intermediate repair was not sufficient to completely close the primary defect. The remaining additional defect was repaired with the flap mentioned below.
Intermediate Repair And Graft Additional Text (Will Appearing After The Standard Complex Repair Text): The intermediate repair was not sufficient to completely close the primary defect. The remaining additional defect was repaired with the graft mentioned below.
Complex Repair And Flap Additional Text (Will Appearing After The Standard Complex Repair Text): The complex repair was not sufficient to completely close the primary defect. The remaining additional defect was repaired with the flap mentioned below.
Complex Repair And Graft Additional Text (Will Appearing After The Standard Complex Repair Text): The complex repair was not sufficient to completely close the primary defect. The remaining additional defect was repaired with the graft mentioned below.
Eyelid Full Thickness Repair - 45740: The eyelid defect was full thickness which required a wedge repair of the eyelid. Special care was taken to ensure that the eyelid margin was realligned when placing sutures.
Eyelid Partial Thickness Repair - 44076: The eyelid defect was partial thickness which required a wedge repair of the eyelid. Special care was taken to ensure that the eyelid margin was realligned when placing sutures.
Manual Repair Warning Statement: We plan on removing the manually selected variable below in favor of our much easier automatic structured text blocks found in the previous tab. We decided to do this to help make the flow better and give you the full power of structured data. Manual selection is never going to be ideal in our platform and I would encourage you to avoid using manual selection from this point on, especially since I will be sunsetting this feature. It is important that you do one of two things with the customized text below. First, you can save all of the text in a word file so you can have it for future reference. Second, transfer the text to the appropriate area in the Library tab. Lastly, if there is a flap or graft type which we do not have you need to let us know right away so I can add it in before the variable is hidden. No need to panic, we plan to give you roughly 6 months to make the change.
Same Histology In Subsequent Stages Text: The pattern and morphology of the tumor is as described in the first stage.
No Residual Tumor Seen Histology Text: Normal epidermis, dermis, and subcutaneous and/or deeper tissue (where applicable) were noted on frozen sections during Mohs Surgery. There were no malignant cells seen in the sections examined.
Inflammation Suggestive Of Cancer Camouflage Histology Text: There was a dense lymphocytic infiltrate which prevented adequate histologic evaluation of adjacent structures.
Incidental Squamous Cell Carcinoma In Situ Histology Text: Incidental squamous cell carcinoma in situ was noted.  The epidermis shows acanthosis with elongation and thickening of the rete ridges.  There are varying degrees of atypia seen within the epidermal cells.  The anaplastic cells are enlarged with hyperchromatic nuclei.  Multiple nucleated epidermal cells are present and parakeratotic nuclei.  Multiple nucleated epidermal cells are present and parakeratotic cells are seen in the stratum corneum.  The anaplastic cells appear to be confined to the epidermis without penetration of the dermal-epidermal junction.
Incidental Actinic Keratosis Histology Text: Actinic keratosis was noted on frozen sections.  The specimen contains foci of disordered epidermal cells confined to the epidermis.  The cells have anaplastic nuclei with parakeratosis and a thickened granular cell layer.  There is papillomatosis and some hyperkeratosis.
Bcc Histology Text: The dermis contains distinctive tumor cell masses of various shapes and sizes composed of cells with large oval or elongated nuclei with relatively little cytoplasm.  The nuclei are homogenous.  There is no pronounced variation in size or intensity of staining and no significant anaplastic appearance.  The cells at the outer aspect of the tumor masses show palisading of nuclei.  Tumor masses are surrounded by a connective tissue stroma and in some areas there is retraction artifact of the tumor nodule away from the surrounding stroma.
Bcc Infiltrative Histology Text: The dermis contains distinctive tumor cell masses of various shapes and sizes composed of cells with large oval or elongated nuclei with relatively little cytoplasm.  The nuclei are homogenous.  There is no pronounced variation in size or intensity of staining and no significant anaplastic appearance.  The cells at the outer aspect of the tumor masses show palisading of nuclei.  Tumor masses are surrounded by a connective tissue stroma and in some areas there is retraction artifact of the tumor nodule away from the surrounding stroma.  The tumor nests are sharply angulated and demonstrating an infiltrating pattern extending deeply with surrounding fibrosis.
Bcc Infundibulocystic Histology Text: The dermis contains distinctive tumor cell masses of various shapes and sizes composed of cells with large oval or elongated nuclei with relatively little cytoplasm.  The nuclei are homogenous.  There is no pronounced variation in size or intensity of staining and no significant anaplastic appearance.  The cells at the outer aspect of the tumor masses show palisading of nuclei.  Tumor masses are surrounded by a connective tissue stroma and in some areas there is retraction artifact of the tumor nodule away from the surrounding stroma.  An infundibulocystic histology is noted.
Bcc Macronodular Histology Text: The dermis contains distinctive tumor cell masses of various shapes and sizes composed of cells with large oval or elongated nuclei with relatively little cytoplasm.  The nuclei are homogenous.  There is no pronounced variation in size or intensity of staining and no significant anaplastic appearance.  The cells at the outer aspect of the tumor masses show palisading of nuclei.  Tumor masses are surrounded by a connective tissue stroma and in some areas there is retraction artifact of the tumor nodule away from the surrounding stroma.  A macronodular histology is noted.
Bcc Micronodular Histology Text: The dermis contains distinctive tumor cell masses of various shapes and sizes composed of cells with large oval or elongated nuclei with relatively little cytoplasm.  The nuclei are homogenous.  There is no pronounced variation in size or intensity of staining and no significant anaplastic appearance.  The cells at the outer aspect of the tumor masses show palisading of nuclei.  Tumor masses are surrounded by a connective tissue stroma and in some areas there is retraction artifact of the tumor nodule away from the surrounding stroma.  A micronodular histology is noted.
Bcc  Morpheaform/Sclerosing Histology Text: The dermis contains distinctive tumor cell masses of various shapes and sizes composed of cells with large oval or elongated nuclei with relatively little cytoplasm.  The nuclei are homogenous.  There is no pronounced variation in size or intensity of staining and no significant anaplastic appearance.  The cells at the outer aspect of the tumor masses show palisading of nuclei.  Tumor masses are surrounded by a connective tissue stroma and in some areas there is retraction artifact of the tumor nodule away from the surrounding stroma.  The tumor nests are sharply angulated and demonstrating an infiltrating pattern extending deeply with surrounding fibrosis, consistent with morpheaform/sclerosing histology.
Bcc  Nodular Histology Text: The dermis contains distinctive tumor cell masses of various shapes and sizes composed of cells with large oval or elongated nuclei with relatively little cytoplasm.  The nuclei are homogenous.  There is no pronounced variation in size or intensity of staining and no significant anaplastic appearance.  The cells at the outer aspect of the tumor masses show palisading of nuclei.  Tumor masses are surrounded by a connective tissue stroma and in some areas there is retraction artifact of the tumor nodule away from the surrounding stroma.  A nodular histology is noted.
Bcc  Nodulocystic Histology Text: The dermis contains distinctive tumor cell masses of various shapes and sizes composed of cells with large oval or elongated nuclei with relatively little cytoplasm.  The nuclei are homogenous.  There is no pronounced variation in size or intensity of staining and no significant anaplastic appearance.  The cells at the outer aspect of the tumor masses show palisading of nuclei.  Tumor masses are surrounded by a connective tissue stroma and in some areas there is retraction artifact of the tumor nodule away from the surrounding stroma.  A nodulocystic histology is noted.
Bcc Pigmented Histology Text: The dermis contains distinctive tumor cell masses of various shapes and sizes composed of cells with large oval or elongated nuclei with relatively little cytoplasm.  The nuclei are homogenous.  There is no pronounced variation in size or intensity of staining and no significant anaplastic appearance.  The cells at the outer aspect of the tumor masses show palisading of nuclei.  Tumor masses are surrounded by a connective tissue stroma and in some areas there is retraction artifact of the tumor nodule away from the surrounding stroma.  Pigmented tumor cells are noted.
Bcc Superficial Histology Text: The dermis contains distinctive tumor cell masses of various shapes and sizes composed of cells with large oval or elongated nuclei with relatively little cytoplasm.  The nuclei are homogenous.  There is no pronounced variation in size or intensity of staining and no significant anaplastic appearance.  The cells at the outer aspect of the tumor masses show palisading of nuclei.  Tumor masses are surrounded by a connective tissue stroma and in some areas there is retraction artifact of the tumor nodule away from the surrounding stroma.  A superficial pattern is noted.
Bcc Superficial Pigmented Histology Text: The dermis contains distinctive tumor cell masses of various shapes and sizes composed of cells with large oval or elongated nuclei with relatively little cytoplasm.  The nuclei are homogenous.  There is no pronounced variation in size or intensity of staining and no significant anaplastic appearance.  The cells at the outer aspect of the tumor masses show palisading of nuclei.  Tumor masses are surrounded by a connective tissue stroma and in some areas there is retraction artifact of the tumor nodule away from the surrounding stroma.  A superficial pigmented pattern is noted.
Mixed Superficial And Nodular Bcc Histology Text: The dermis contains distinctive tumor cell masses of various shapes and sizes composed of cells with large oval or elongated nuclei with relatively little cytoplasm.  The nuclei are homogenous.  There is no pronounced variation in size or intensity of staining and no significant anaplastic appearance.  The cells at the outer aspect of the tumor masses show palisading of nuclei.  Tumor masses are surrounded by a connective tissue stroma and in some areas there is retraction artifact of the tumor nodule away from the surrounding stroma.  A mixed superficial and nodular pattern is noted.
Mixed Nodular And Infiltrative Bcc Histology Text: The dermis contains distinctive tumor cell masses of various shapes and sizes composed of cells with large oval or elongated nuclei with relatively little cytoplasm.  The nuclei are homogenous.  There is no pronounced variation in size or intensity of staining and no significant anaplastic appearance.  The cells at the outer aspect of the tumor masses show palisading of nuclei.  Tumor masses are surrounded by a connective tissue stroma and in some areas there is retraction artifact of the tumor nodule away from the surrounding stroma.  There is an admixture of nodular pattern BCC, as well as tumor nests that are sharply angulated and demonstrating an infiltrating pattern extending deeply with surrounding fibrosis.
Mixed Nodular And Micronodular Bcc Histology Text: The dermis contains distinctive tumor cell masses of various shapes and sizes composed of cells with large oval or elongated nuclei with relatively little cytoplasm.  The nuclei are homogenous.  There is no pronounced variation in size or intensity of staining and no significant anaplastic appearance.  The cells at the outer aspect of the tumor masses show palisading of nuclei.  Tumor masses are surrounded by a connective tissue stroma and in some areas there is retraction artifact of the tumor nodule away from the surrounding stroma.  A mixed nodular and micronodular pattern is noted.
Metatypical Bcc Histology Text: The dermis contains distinctive tumor cell masses of various shapes and sizes composed of cells with large oval or elongated nuclei with relatively little cytoplasm.  The nuclei are homogenous.  There is no pronounced variation in size or intensity of staining and no significant anaplastic appearance.  The cells at the outer aspect of the tumor masses show palisading of nuclei.  Tumor masses are surrounded by a connective tissue stroma and in some areas there is retraction artifact of the tumor nodule away from the surrounding stroma.  Squamous differentiation consistent with metatypical BCC is noted.
Scc Histology Text: There are irregular masses of epidermal cells in the upper dermis.  Within the tumor masses, there are varying proportions of normal squamous cells and anaplastic squamous cells.  The anaplastic cells have variation in size and shape with hyperplasia and hyperchromasia of the nuclei, absence of intracellular bridges and varying degrees of keratinization.
Scc Well Differentiated Histology Text: There are irregular masses of epidermal cells in the upper dermis.  Within the tumor masses, there are varying proportions of normal squamous cells and anaplastic squamous cells.  The anaplastic cells have variation in size and shape with hyperplasia and hyperchromasia of the nuclei, absence of intracellular bridges and varying degrees of keratinization.  A well differentiated pattern is noted.
Scc Moderately Differentiated Histology Text: There are irregular masses of epidermal cells in the upper dermis.  Within the tumor masses, there are varying proportions of normal squamous cells and anaplastic squamous cells.  The anaplastic cells have variation in size and shape with hyperplasia and hyperchromasia of the nuclei, absence of intracellular bridges and varying degrees of keratinization.  A moderately differentiated pattern is noted.
Scc Poorly Differentiated Histology Text: There are irregular masses of epidermal cells in the upper dermis.  Within the tumor masses, there are varying proportions of normal squamous cells and anaplastic squamous cells.  The anaplastic cells have variation in size and shape with hyperplasia and hyperchromasia of the nuclei, absence of intracellular bridges and varying degrees of keratinization.  A poorly differentiated pattern is noted.
Scc Acantholytic Histology Text: There are irregular masses of epidermal cells in the upper dermis.  Within the tumor masses, there are varying proportions of normal squamous cells and anaplastic squamous cells.  The anaplastic cells have variation in size and shape with hyperplasia and hyperchromasia of the nuclei, absence of intracellular bridges and varying degrees of keratinization.  An acantholytic pattern is noted.
Scc Ka Subtype Histology Text: On low power, a keratin filled invagination of the epidermis is noted to penetrate into the dermis.  There appear to be collections of epidermal cells with nuclear atypia in and around the base of the invagination.  These dyskeratotic cells show individual cells keratinization and appear eosinophilic.  A pronounced inflammatory cell infiltrate is noted in the surrounding dermis.  The surrounding epidermis extends as a buttress over the crater formed by the invagination.  At high magnification, the epidermal proliferations at the base of the crater show enlarged epidermal cells with hyperchromatic nuclei, consistent with SCC keratoacanthoma subtype.
Scc Spindle Histology Text: There are irregular masses of epidermal cells in the upper dermis.  Within the tumor masses, there are varying proportions of normal squamous cells and anaplastic squamous cells.  The anaplastic cells have variation in size and shape with hyperplasia and hyperchromasia of the nuclei, absence of intracellular bridges and varying degrees of keratinization.  A spindle cell pattern is noted.
Scc In Situ Histology Text: The epidermis shows acanthosis with elongation and thickening of the rete ridges.  There are varying degrees of atypia seen within the epidermal cells.  The anaplastic cells are enlarged with hyperchromatic nuclei.  Multiple nucleated epidermal cells are present and parakeratotic nuclei.  Multiple nucleated epidermal cells are present and parakeratotic cells are seen in the stratum corneum.  The anaplastic cells appear to be confined to the epidermis without penetration of the dermal- epidermal junction.
Lentigo Maligna Histology Text: Small clusters of uniformly round large melanocytes with atypical hyperchromatic nuclei and abundant cytoplasm are seen scattered throughout the lower levels of the epidermis.  Similar cells are seen singly in the upper layers of the epidermis.  There appears to be no penetration of these atypical melanocytes through the dermal epidermal junction and all cells appear to be confined to the epidermis.
Merkel Cell Carcinoma Histology Text: In the dermis and extending into the subcutaneous tissue, there are multiple anastomosing cords, clusters and sheets of basophilic cells.  These collections of cells do not appear to be connected to the epidermis.  Individual tumor cells have a very homogenous appearance.  They are closely spaced round ells that stain intensely blue with hematoxylin eosin.  These cells have vesiculare nuclei and scantly ill -defined cytoplasm.  Mitotic figures are seen.
Afx Histology Text: Mild to severe solar elastosis is noted.  Aggregates of dermal tumor cells  with moderate to severe pleomorphism with spindle, epithelioid, or multinucleated forms and atypical mitotic figures are seen.
Basosquamous Cell Carcinoma Histology Text: There are multiple islands of malignant cells throughout the dermis, exhibiting both basaloid and squamous differentiation. Some areas show BCC, with large and rounded basaloid cells, while others show SCC, with polygonal squamoid cells with abundant eosinophilic cytoplasm, large nuclei, and prominent nucleoli. The SCC component exhibited frequent atypical mitosis, cell and nuclear pleomorphism and hyperchromatism of the nuclei.
Dfsp Histology Text: In the dermis and subcutaneous fat there is a tumor mass consisting of cells with large spindle shaped nuclei embedded in varying amounts of collagen.  In some areas these cells are arranged in irregular bands while in others the intertwining bands result in a matte -like or a whorl -like fashion.  In some areas, the cells have enlarged nuclei with a moderate degree of atypia, and mitotic figures are seen.
Microcystic Adnexal Carcinoma Histology Text: Atypical epithelial cells are seen in the dermis arranged singly or in groups or organized as a cell-lined cystic cavity.  These cells are small in size with anaplastic appearing small nuclei.  When seen in the cystic configuration they resemble sweat ducts or glands.  In some areas the cells assume a spindle shape.  A slight stromal reaction is seen around the cells.
Sebaceous Carcinoma Histology Text: In the dermis, there are irregular lobules of varying size composed of undifferentiated sebaceous cells with foamy cytoplasm.  These cells, as well as their nuclei are of various sizes and shape.
Mart-1 - Positive Histology Text: MART-1 staining demonstrates areas of higher density and clustering of melanocytes with Pagetoid spread upwards within the epidermis. The surgical margins are positive for tumor cells.
Mart-1 - Negative Histology Text: MART-1 staining demonstrates a normal density and pattern of melanocytes along the dermal-epidermal junction. The surgical margins are negative for tumor cells.
Clia Id #: [id number]
Information: Selecting Yes will display possible errors in your note based on the variables you have selected. This validation is only offered as a suggestion for you. PLEASE NOTE THAT THE VALIDATION TEXT WILL BE REMOVED WHEN YOU FINALIZE YOUR NOTE. IF YOU WANT TO FAX A PRELIMINARY NOTE YOU WILL NEED TO TOGGLE THIS TO 'NO' IF YOU DO NOT WANT IT IN YOUR FAXED NOTE.
Bill 59 Modifier?: No - Continue to Bill 79 Modifier

## 2024-07-09 NOTE — PROCEDURE: POST-OP WOUND CHECK (NO GLOBAL PERIOD)
Detail Level: Detailed
Wound Evaluated By: Patricio Hatfield M.D.\\n\\nWound care instructions were reviewed in detail.

## 2024-10-24 ENCOUNTER — APPOINTMENT (RX ONLY)
Dept: URBAN - METROPOLITAN AREA CLINIC 374 | Facility: CLINIC | Age: 69
Setting detail: DERMATOLOGY
End: 2024-10-24

## 2024-10-24 DIAGNOSIS — D485 NEOPLASM OF UNCERTAIN BEHAVIOR OF SKIN: ICD-10-CM

## 2024-10-24 DIAGNOSIS — L57.0 ACTINIC KERATOSIS: ICD-10-CM | Status: INADEQUATELY CONTROLLED

## 2024-10-24 PROBLEM — D48.5 NEOPLASM OF UNCERTAIN BEHAVIOR OF SKIN: Status: ACTIVE | Noted: 2024-10-24

## 2024-10-24 PROCEDURE — 17000 DESTRUCT PREMALG LESION: CPT | Mod: 59

## 2024-10-24 PROCEDURE — ? PRESCRIPTION MEDICATION MANAGEMENT

## 2024-10-24 PROCEDURE — ? COUNSELING

## 2024-10-24 PROCEDURE — 17003 DESTRUCT PREMALG LES 2-14: CPT

## 2024-10-24 PROCEDURE — ? LIQUID NITROGEN

## 2024-10-24 PROCEDURE — ? PHOTO-DOCUMENTATION

## 2024-10-24 PROCEDURE — 11103 TANGNTL BX SKIN EA SEP/ADDL: CPT

## 2024-10-24 PROCEDURE — 11102 TANGNTL BX SKIN SINGLE LES: CPT

## 2024-10-24 PROCEDURE — ? BIOPSY BY SHAVE METHOD

## 2024-10-24 PROCEDURE — 99213 OFFICE O/P EST LOW 20 MIN: CPT | Mod: 25

## 2024-10-24 ASSESSMENT — LOCATION SIMPLE DESCRIPTION DERM
LOCATION SIMPLE: RIGHT CLAVICULAR SKIN
LOCATION SIMPLE: RIGHT SHOULDER
LOCATION SIMPLE: RIGHT FOREARM
LOCATION SIMPLE: POSTERIOR NECK

## 2024-10-24 ASSESSMENT — LOCATION ZONE DERM
LOCATION ZONE: ARM
LOCATION ZONE: NECK
LOCATION ZONE: TRUNK

## 2024-10-24 ASSESSMENT — LOCATION DETAILED DESCRIPTION DERM
LOCATION DETAILED: RIGHT DISTAL DORSAL FOREARM
LOCATION DETAILED: RIGHT ANTERIOR SHOULDER
LOCATION DETAILED: RIGHT PROXIMAL DORSAL FOREARM
LOCATION DETAILED: RIGHT CLAVICULAR SKIN
LOCATION DETAILED: RIGHT LATERAL TRAPEZIAL NECK

## 2024-10-24 NOTE — PROCEDURE: PRESCRIPTION MEDICATION MANAGEMENT
Render In Strict Bullet Format?: No
Detail Level: Zone
Plan: Pt has RX at home. Advised her to use for 2 weeks and then we will follow up in Dec 2024 to recheck area.
Initiate Treatment: Efudex 5% cream - apply thin layer to face BID x 2 weeks

## 2024-10-24 NOTE — PROCEDURE: BIOPSY BY SHAVE METHOD
Detail Level: Detailed
Depth Of Biopsy: dermis
Was A Bandage Applied: Yes
Size Of Lesion In Cm: 1
X Size Of Lesion In Cm: 0
Biopsy Type: H and E
Biopsy Method: Dermablade
Anesthesia Type: 1% lidocaine with epinephrine
Anesthesia Volume In Cc: 0.2
Hemostasis: Drysol
Wound Care: Petrolatum
Dressing: bandage
Destruction After The Procedure: No
Type Of Destruction Used: Curettage
Curettage Text: The wound bed was treated with curettage after the biopsy was performed.
Cryotherapy Text: The wound bed was treated with cryotherapy after the biopsy was performed.
Electrodesiccation Text: The wound bed was treated with electrodesiccation after the biopsy was performed.
Electrodesiccation And Curettage Text: The wound bed was treated with electrodesiccation and curettage after the biopsy was performed.
Silver Nitrate Text: The wound bed was treated with silver nitrate after the biopsy was performed.
Lab: [id number]
Lab Facility: [id number]
Consent: Written consent was obtained and risks were reviewed including but not limited to scarring, infection, bleeding, scabbing, incomplete removal, nerve damage and allergy to anesthesia.
Post-Care Instructions: I reviewed with the patient in detail post-care instructions. Patient is to keep the biopsy site dry overnight, and then apply bacitracin twice daily until healed. Patient may apply hydrogen peroxide soaks to remove any crusting.
Notification Instructions: Patient will be notified of biopsy results. However, patient instructed to call the office if not contacted within 2 weeks.
Billing Type: Third-Party Bill
Information: Selecting Yes will display possible errors in your note based on the variables you have selected. This validation is only offered as a suggestion for you. PLEASE NOTE THAT THE VALIDATION TEXT WILL BE REMOVED WHEN YOU FINALIZE YOUR NOTE. IF YOU WANT TO FAX A PRELIMINARY NOTE YOU WILL NEED TO TOGGLE THIS TO 'NO' IF YOU DO NOT WANT IT IN YOUR FAXED NOTE.

## 2024-10-24 NOTE — PROCEDURE: LIQUID NITROGEN
Show Applicator Variable?: Yes
Detail Level: Detailed
Duration Of Freeze Thaw-Cycle (Seconds): 3
Render Post-Care Instructions In Note?: no
Number Of Freeze-Thaw Cycles: 3 freeze-thaw cycles
Post-Care Instructions: I reviewed with the patient in detail post-care instructions. Patient is to wear sunprotection, and avoid picking at any of the treated lesions. Pt may apply Vaseline to crusted or scabbing areas.
Application Tool (Optional): Liquid Nitrogen Sprayer
Consent: The patient's consent was obtained including but not limited to risks of crusting, scabbing, blistering, scarring, darker or lighter pigmentary change, recurrence, incomplete removal and infection.

## 2024-11-21 ENCOUNTER — APPOINTMENT (RX ONLY)
Dept: URBAN - METROPOLITAN AREA CLINIC 374 | Facility: CLINIC | Age: 69
Setting detail: DERMATOLOGY
End: 2024-11-21

## 2024-11-21 DIAGNOSIS — L57.0 ACTINIC KERATOSIS: ICD-10-CM | Status: INADEQUATELY CONTROLLED

## 2024-11-21 PROCEDURE — ? PHOTO-DOCUMENTATION

## 2024-11-21 PROCEDURE — ? COUNSELING

## 2024-11-21 PROCEDURE — 17000 DESTRUCT PREMALG LESION: CPT

## 2024-11-21 PROCEDURE — ? LIQUID NITROGEN

## 2024-11-21 ASSESSMENT — LOCATION DETAILED DESCRIPTION DERM: LOCATION DETAILED: RIGHT PROXIMAL DORSAL FOREARM

## 2024-11-21 ASSESSMENT — LOCATION ZONE DERM: LOCATION ZONE: ARM

## 2024-11-21 ASSESSMENT — LOCATION SIMPLE DESCRIPTION DERM: LOCATION SIMPLE: RIGHT FOREARM

## 2024-11-21 NOTE — PROCEDURE: LIQUID NITROGEN
Duration Of Freeze Thaw-Cycle (Seconds): 3
Number Of Freeze-Thaw Cycles: 3 freeze-thaw cycles
Show Aperture Variable?: Yes
Detail Level: Detailed
Application Tool (Optional): Liquid Nitrogen Sprayer
Consent: The patient's consent was obtained including but not limited to risks of crusting, scabbing, blistering, scarring, darker or lighter pigmentary change, recurrence, incomplete removal and infection.
Render Post-Care Instructions In Note?: no
Post-Care Instructions: I reviewed with the patient in detail post-care instructions. Patient is to wear sunprotection, and avoid picking at any of the treated lesions. Pt may apply Vaseline to crusted or scabbing areas.

## 2024-12-17 ENCOUNTER — APPOINTMENT (OUTPATIENT)
Dept: URBAN - METROPOLITAN AREA CLINIC 26 | Facility: CLINIC | Age: 69
Setting detail: DERMATOLOGY
End: 2024-12-17

## 2024-12-17 PROBLEM — C44.42 SQUAMOUS CELL CARCINOMA OF SKIN OF SCALP AND NECK: Status: ACTIVE | Noted: 2024-12-17

## 2024-12-17 PROCEDURE — 17311 MOHS 1 STAGE H/N/HF/G: CPT

## 2024-12-17 PROCEDURE — ? MOHS SURGERY

## 2024-12-17 PROCEDURE — 12042 INTMD RPR N-HF/GENIT2.6-7.5: CPT

## 2024-12-17 NOTE — PROCEDURE: MOHS SURGERY
Mohs Case Number: [id number]
Date Of Previous Biopsy (Optional): 10/24/2024
Previous Accession (Optional): [id number]
Biopsy Photograph Reviewed: Yes
Referring Physician (Optional): Clotilde SOLOMON
Consent Type: Consent 1 (Standard)
Eye Shield Used: No
Initial Size Of Lesion: 0.7
Number Of Stages: 1
Primary Defect Length In Cm (Final Defect Size - Required For Flaps/Grafts): 1.4
Primary Defect Width In Cm (Final Defect Size - Required For Flaps/Grafts): 1.2
Primary Defect Depth In Cm (Optional But Required For Some Insurers): 0
Repair Type: Intermediate Layered Repair
Which Instrument Did You Use For Dermabrasion?: Wire Brush
Which Eyelid Repair Cpt Are You Using?: 67539
Oculoplastic Surgeon Procedure Text (A): After obtaining clear surgical margins the patient was sent to oculoplastics for surgical repair.  The patient understands they will receive post-surgical care and follow-up from the referring physician's office.
Otolaryngologist Procedure Text (A): After obtaining clear surgical margins the patient was sent to otolaryngology for surgical repair.  The patient understands they will receive post-surgical care and follow-up from the referring physician's office.
Plastic Surgeon Procedure Text (A): After obtaining clear surgical margins the patient was sent to plastics for surgical repair.  The patient understands they will receive post-surgical care and follow-up from the referring physician's office.
Mid-Level Procedure Text (A): After obtaining clear surgical margins the patient was sent to a mid-level provider for surgical repair.  The patient understands they will receive post-surgical care and follow-up from the mid-level provider.
Provider Procedure Text (A): After obtaining clear surgical margins the defect was repaired by another provider.
Asc Procedure Text (A): After obtaining clear surgical margins the patient was sent to an ASC for surgical repair.  The patient understands they will receive post-surgical care and follow-up from the ASC physician.
Simple / Intermediate / Complex Repair - Final Wound Length In Cm: 3.4
Suturegard Retention Suture: 2-0 Nylon
Retention Suture Bite Size: 3 mm
Length To Time In Minutes Device Was In Place: 10
Undermining Type: Entire Wound
Debridement Text: The wound edges were debrided prior to proceeding with the closure to facilitate wound healing.
Helical Rim Text: The closure involved the helical rim.
Vermilion Border Text: The closure involved the vermilion border.
Nostril Rim Text: The closure involved the nostril rim.
Retention Suture Text: Retention sutures were placed to support the closure and prevent dehiscence.
Location Indication Override (Is Already Calculated Based On Selected Body Location): Area M
Area H Indication Text: Tumors in this location are included in Area H (eyelids, eyebrows, central face, nose, lips, chin, ear, pre-auricular, post-auricular, temple, genitalia, hands, feet, ankles and areola).  Tissue conservation is critical in these anatomic locations.
Area M Indication Text: Tumors in this location are included in Area M (cheek, forehead, scalp, neck, jawline and pretibial skin).  Mohs surgery is indicated for tumors in these anatomic locations.
Area L Indication Text: Tumors in this location are included in Area L (trunk and extremities).  Mohs surgery is indicated for larger tumors, or tumors with aggressive histologic features, in these anatomic locations.
Tumor Debulked?: scalpel
Depth Of Tumor Invasion (For Histology): tumor not visualized (deep and peripheral margins are clear of tumor)
Perineural Invasion (For Histology - Be Specific If Possible): absent
Special Stains Stage 1 - Results: Base On Clearance Noted Above
Stage 2: Additional Anesthesia Type: 1% lidocaine with 1:100,000 epinephrine
Staging Info: By selecting yes to the question above you will include information on AJCC 8 tumor staging in your Mohs note. Information on tumor staging will be automatically added for SCCs on the head and neck. AJCC 8 includes tumor size, tumor depth, perineural involvement and bone invasion.
Tumor Depth: Less than 6mm from granular layer and no invasion beyond the subcutaneous fat
Why Was The Change Made?: Please Select the Appropriate Response
Medical Necessity Statement: Based on my medical judgement, Mohs surgery is the most appropriate treatment for this cancer compared to other treatments.
Alternatives Discussed Intro (Do Not Add Period): I discussed alternative treatments to Mohs surgery and specifically discussed the risks and benefits of
Consent 1/Introductory Paragraph: The rationale for Mohs was explained to the patient and consent was obtained. The risks, benefits and alternatives to therapy were discussed in detail. Specifically, the risks of infection, scarring, bleeding, prolonged wound healing, incomplete removal, allergy to anesthesia, nerve injury and recurrence were addressed. Prior to the procedure, the treatment site was clearly identified and confirmed by the patient using a hand mirror. All components of Universal Protocol/PAUSE Rule completed.
Consent 2/Introductory Paragraph: I explained to the patient that excisional surgery with margin control is the standard treatment for melanoma.  Mohs Surgery, which is a form of excisional surgery with margin control, over the past few decades has become a treatment within that standard of care for melanoma.  Many now consider it the standard of care for melanoma, especially melanoma in situ, on the head and neck given its more complete margin examination, and evidence supporting higher cure rate compared with conventional excision.  In this case, initial margins of 0.6 cm, in excess of the standard margins of 0.5 cm were employed for the first stage of Mohs Surgery.  \\n\\nMohs surgery was explained to the patient and consent was obtained. The risks, benefits and alternatives to therapy were discussed in detail. Specifically, the risks of infection, scarring, bleeding, prolonged wound healing, incomplete removal, allergy to anesthesia, nerve injury and recurrence were addressed. Prior to the procedure, the treatment site was clearly identified and confirmed by the patient  using a hand mirror. All components of Universal Protocol/PAUSE Rule completed.  \\n\\nMART-1 (Melanoma Antigen Recognized by T-cells) antibody immunostaining was used during Mohs surgery as per standard protocol, in addition to routine processing of all specimens with hematoxylin and eosin. One or more of the reagents used in immunohistochemical testing in this case may not have been cleared or approved by the [place]. Food and Drug Administration (FDA). The FDA has determined that such clearance or approval is not necessary. These tests are used for clinical purposes. They should not be regarded as investigational or for research.
Consent 3/Introductory Paragraph: I gave the patient a chance to ask questions they had about the procedure.  Following this I explained the Mohs procedure and consent was obtained. The risks, benefits and alternatives to therapy were discussed in detail. Specifically, the risks of infection, scarring, bleeding, prolonged wound healing, incomplete removal, allergy to anesthesia, nerve injury and recurrence were addressed. Prior to the procedure, the treatment site was clearly identified and confirmed by the patient using a hand mirror. All components of Universal Protocol/PAUSE Rule completed.
Consent (Temporal Branch)/Introductory Paragraph: The rationale for Mohs was explained to the patient and consent was obtained. The risks, benefits and alternatives to therapy were discussed in detail. Specifically, the risks of damage to the temporal branch of the facial nerve, infection, scarring, bleeding, prolonged wound healing, incomplete removal, allergy to anesthesia, and recurrence were addressed. Prior to the procedure, the treatment site was clearly identified and confirmed by the patient using a hand mirror. All components of Universal Protocol/PAUSE Rule completed.
Consent (Marginal Mandibular)/Introductory Paragraph: The rationale for Mohs was explained to the patient and consent was obtained. The risks, benefits and alternatives to therapy were discussed in detail. Specifically, the risks of damage to the marginal mandibular branch of the facial nerve, infection, scarring, bleeding, prolonged wound healing, incomplete removal, allergy to anesthesia, and recurrence were addressed. Prior to the procedure, the treatment site was clearly identified and confirmed by the patient using a hand mirror. All components of Universal Protocol/PAUSE Rule completed.
Consent (Spinal Accessory)/Introductory Paragraph: The rationale for Mohs was explained to the patient and consent was obtained. The risks, benefits and alternatives to therapy were discussed in detail. Specifically, the risks of damage to the spinal accessory nerve, infection, scarring, bleeding, prolonged wound healing, incomplete removal, allergy to anesthesia, and recurrence were addressed. Prior to the procedure, the treatment site was clearly identified and confirmed by the patient using a hand mirror. All components of Universal Protocol/PAUSE Rule completed.
Consent (Near Eyelid Margin)/Introductory Paragraph: The rationale for Mohs was explained to the patient and consent was obtained. The risks, benefits and alternatives to therapy were discussed in detail. Specifically, the risks of ectropion or eyelid deformity, infection, scarring, bleeding, prolonged wound healing, incomplete removal, allergy to anesthesia, nerve injury and recurrence were addressed. Prior to the procedure, the treatment site was clearly identified and confirmed by the patient using a hand mirror. All components of Universal Protocol/PAUSE Rule completed.
Consent (Ear)/Introductory Paragraph: The rationale for Mohs was explained to the patient and consent was obtained. The risks, benefits and alternatives to therapy were discussed in detail. Specifically, the risks of ear deformity, infection, scarring, bleeding, prolonged wound healing, incomplete removal, allergy to anesthesia, nerve injury and recurrence were addressed. Prior to the procedure, the treatment site was clearly identified and confirmed by the patient using a hand mirror. All components of Universal Protocol/PAUSE Rule completed.
Consent (Nose)/Introductory Paragraph: The rationale for Mohs was explained to the patient and consent was obtained. The risks, benefits and alternatives to therapy were discussed in detail. Specifically, the risks of nasal deformity, changes in the flow of air through the nose, infection, scarring, bleeding, prolonged wound healing, incomplete removal, allergy to anesthesia, nerve injury and recurrence were addressed. Prior to the procedure, the treatment site was clearly identified and confirmed by the patient using a hand mirror. All components of Universal Protocol/PAUSE Rule completed.
Consent (Lip)/Introductory Paragraph: The rationale for Mohs was explained to the patient and consent was obtained. The risks, benefits and alternatives to therapy were discussed in detail. Specifically, the risks of lip deformity, changes in the oral aperture, infection, scarring, bleeding, prolonged wound healing, incomplete removal, allergy to anesthesia, nerve injury and recurrence were addressed. Prior to the procedure, the treatment site was clearly identified and confirmed by the patient using a hand mirror. All components of Universal Protocol/PAUSE Rule completed.
Consent (Scalp)/Introductory Paragraph: The rationale for Mohs was explained to the patient and consent was obtained. The risks, benefits and alternatives to therapy were discussed in detail. Specifically, the risks of alopecia in or around the treatment site, changes in hair growth pattern secondary to repair, infection, scarring, bleeding, prolonged wound healing, incomplete removal, allergy to anesthesia, nerve injury and recurrence were addressed. Prior to the procedure, the treatment site was clearly identified and confirmed by the patient using a hand mirror. All components of Universal Protocol/PAUSE Rule completed.
Detail Level: Detailed
Postop Diagnosis: same
Surgeon: Patricio Hatfield M.D.
Anesthesia Type: 1% lidocaine with epinephrine
Anesthesia Volume In Cc: 2
Hemostasis: Electrodesiccation
Estimated Blood Loss (Cc): minimal
Repair Anesthesia Method: local infiltration
Anesthesia Volume In Cc: 3
Brow Lift Text: A midfrontal incision was made medially to the defect to allow access to the tissues just superior to the left eyebrow. Following careful dissection inferiorly in a supraperiosteal plane to the level of the left eyebrow, several 3-0 monocryl sutures were used to resuspend the eyebrow orbicularis oculi muscular unit to the superior frontal bone periosteum. This resulted in an appropriate reapproximation of static eyebrow symmetry and correction of the left brow ptosis.
Deep Sutures: 4-0 Vicryl
Epidermal Sutures: GluSeal
Suturegard Intro: Intraoperative tissue expansion was performed, utilizing the SUTUREGARD device, in order to reduce wound tension.
Suturegard Body: The suture ends were repeatedly re-tightened and re-clamped to achieve the desired tissue expansion.
Hemigard Intro: Due to skin fragility and wound tension, it was decided to use HEMIGARD adhesive retention suture devices to permit a linear closure. The skin was cleaned and dried for a 6cm distance away from the wound. Excessive hair, if present, was removed to allow for adhesion.
Hemigard Postcare Instructions: The HEMIGARD strips are to remain completely dry for at least 5-7 days.
Donor Site Anesthesia Type: same as repair anesthesia
Graft Donor Site Bandage (Optional-Leave Blank If You Don't Want In Note): A pressure bandage with telfa was applied to the donor site.
Closure 2 Information: This tab is for additional flaps and grafts, including complex repair and grafts and complex repair and flaps. You can also specify a different location for the additional defect, if the location is the same you do not need to select a new one. We will insert the automated text for the repair you select below just as we do for solitary flaps and grafts. Please note that at this time if you select a location with a different insurance zone you will need to override the ICD10 and CPT if appropriate.
Closure 3 Information: This tab is for additional flaps and grafts above and beyond our usual structured repairs.  Please note if you enter information here it will not currently bill and you will need to add the billing information manually.
Wound Care: Mastisol
Dressing: steri-strips and pressure dressing
Unna Boot Text: An Unna boot was placed to help immobilize the limb and facilitate more rapid healing.
Home Suture Removal Text: Patient was provided instructions on removing sutures and will remove their sutures at home.  If they have any questions or difficulties they will call the office.
Post-Care Instructions: I reviewed with the patient in detail post-care instructions. Patient is not to engage in any strenuous activity for at least 48 hours, and is to avoid heavy lifting, strenuous exercise, or swimming for the next 14 days. Should the patient develop any fevers, chills, bleeding, or severe pain, the patient will contact the office immediately.
Pain Refusal Text: I offered to prescribe pain medication but the patient refused to take this medication.
Mauc Instructions: By selecting yes to the question below the MAUC number will be added into the note.  This will be calculated automatically based on the diagnosis chosen, the size entered, the body zone selected (H,M,L) and the specific indications you chose. You will also have the option to override the Mohs AUC if you disagree with the automatically calculated number and this option is found in the Case Summary tab.
Where Do You Want The Question To Include Opioid Counseling Located?: Case Summary Tab
Eye Protection Verbiage: Before proceeding with the stage, a plastic scleral shield was inserted. The globe was anesthetized with a few drops of 1% lidocaine with 1:100,000 epinephrine. Then, an appropriate sized scleral shield was chosen and coated with lacrilube ointment. The shield was gently inserted and left in place for the duration of each stage. After the stage was completed, the shield was gently removed.
Mohs Method Verbiage: After tumor debulking, and with each stage, an incision was made into a layer of healthy appearing skin around the clinically apparent tissue following the standard Mohs approach.  The specimen was harvested as a microscopically controlled layer.  A map was prepared to correspond to the area of skin from which it was excised.  The tissue was inked, divided as necessary, and prepared for frozen section processing.
Surgeon/Pathologist Verbiage (Will Incorporate Name Of Surgeon From Intro If Not Blank): operated in two distinct and integrated capacities as the surgeon and pathologist.
Mohs Histo Method Verbiage: Each section was then chromacoded and processed in the Mohs laboratory using the Mohs protocol and submitted for tissue processing with frozen sections prepared in the Mohs laboratory. The entire base and margins of the excised tissue were examined by the surgeon.
Subsequent Stages Histo Method Verbiage: Using a similar technique to that described above, a thin layer of tissue was removed from all areas where tumor was visible on the previous stage.  The tissue was again oriented, mapped, dyed, and processed as above.
Mohs Rapid Report Verbiage: The area of clinically evident tumor was marked with skin marking ink and appropriately hatched.  The initial incision was made following the Mohs approach through the skin.  The specimen was taken to the lab, divided into the necessary number of pieces, chromacoded and processed according to the Mohs protocol.  This was repeated in successive stages until a tumor free defect was achieved.
Complex Repair Preamble Text (Leave Blank If You Do Not Want): The wound edges and deep margins of the Mohs defect were debrided of excess dermis and adipose tissue.  Retention sutures encompassing deep dermal, adipose, and fascial layers were placed to alleviate the tension on the primary suture line and to decrease the potential for wound dehiscence.
Crescentic Complex Repair Preamble Text (Leave Blank If You Do Not Want): Three layered closure, including plication of the fascia underlying the defect, as well as extensive wide undermining were performed.
M-Plasty Complex Repair Preamble Text (Leave Blank If You Do Not Want): Extensive wide undermining was performed.
Crescentic Intermediate Repair Preamble Text (Leave Blank If You Do Not Want): Undermining was performed with blunt dissection.
Graft Cartilage Fenestration Text: The cartilage was fenestrated with a 2mm punch biopsy to help facilitate graft survival and healing.
Non-Graft Cartilage Fenestration Text: The cartilage was fenestrated with a 2mm punch biopsy to help facilitate healing.
Secondary Intention Text (Leave Blank If You Do Not Want): After discussion and consideration of repair and wound management options, the patient opted to allow the defect to heal by second intent.
No Repair - Repaired With Adjacent Surgical Defect Text (Leave Blank If You Do Not Want): After obtaining clear surgical margins the defect was repaired concurrently with another surgical defect which was in close approximation.
Adjacent Tissue Transfer Text: The defect edges were debeveled with a #15 scalpel blade.  Given the location of the defect and the proximity to free margins an adjacent tissue transfer was deemed most appropriate.  Using a sterile surgical marker, an appropriate flap was drawn incorporating the defect and placing the expected incisions within the relaxed skin tension lines where possible.    The area thus outlined was incised deep to adipose tissue with a #15 scalpel blade.  The skin margins were undermined to an appropriate distance in all directions utilizing iris scissors.
Advancement Flap (Single) Text: The defect edges were debeveled with a #15 scalpel blade.  Given the location of the defect and the proximity to free margins a single advancement flap was deemed most appropriate.  Using a sterile surgical marker, an appropriate advancement flap was drawn incorporating the defect and placing the expected incisions within the relaxed skin tension lines where possible.    The area thus outlined was incised deep to adipose tissue with a #15 scalpel blade and carried over to the primary defect.  The skin margins were undermined to an appropriate distance in all directions utilizing iris scissors.
Advancement Flap (Double) Text: The defect edges were debeveled with a #15 scalpel blade.  Given the location of the defect and the proximity to free margins a double advancement flap was deemed most appropriate.  Using a sterile surgical marker, the appropriate advancement flaps were drawn incorporating the defect and placing the expected incisions within the relaxed skin tension lines where possible.    The area thus outlined was incised deep to adipose tissue with a #15 scalpel blade and carried over to the primary defect.  The skin margins were undermined to an appropriate distance in all directions utilizing iris scissors.
Advancement-Rotation Flap Text: The defect edges were debeveled with a #15 scalpel blade.  Given the location of the defect, shape of the defect and the proximity to free margins an advancement-rotation flap was deemed most appropriate.  Using a sterile surgical marker, an appropriate flap was drawn incorporating the defect and placing the expected incisions within the relaxed skin tension lines where possible. The area thus outlined was incised deep to adipose tissue with a #15 scalpel blade.  The skin margins were undermined to an appropriate distance in all directions utilizing iris scissors.
Alar Island Pedicle Flap Text: The defect edges were debeveled with a #15 scalpel blade.  Given the location of the defect, shape of the defect and the proximity to the alar rim an island pedicle advancement flap was deemed most appropriate.  Using a sterile surgical marker, an appropriate advancement flap was drawn incorporating the defect, outlining the appropriate donor tissue and placing the expected incisions within the nasal ala running parallel to the alar rim. The area thus outlined was incised with a #15 scalpel blade and carried over to the primary defect.  The skin margins were undermined minimally to an appropriate distance in all directions around the primary defect and laterally outward around the island pedicle utilizing iris scissors.  There was minimal undermining beneath the pedicle flap.
A-T Advancement Flap Text: The defect edges were debeveled with a #15 scalpel blade.  Given the location of the defect, shape of the defect and the proximity to free margins an A-T advancement flap was deemed most appropriate.  Using a sterile surgical marker, an appropriate advancement flap was drawn incorporating the defect and placing the expected incisions within the relaxed skin tension lines where possible.    The area thus outlined was incised deep to adipose tissue with a #15 scalpel blade and carried over to the primary defect.  The skin margins were undermined to an appropriate distance in all directions utilizing iris scissors.
Banner Transposition Flap Text: The defect edges were debeveled with a #15 scalpel blade.  Given the location of the defect and the proximity to free margins a Banner transposition flap was deemed most appropriate.  Using a sterile surgical marker, an appropriate flap drawn around the defect. The area thus outlined was incised deep to adipose tissue with a #15 scalpel blade.  The skin margins were undermined to an appropriate distance in all directions utilizing iris scissors.
Bilateral Helical Rim Advancement Flap Text: The defect edges were debeveled with a #15 blade scalpel.  Given the location of the defect and the proximity to free margins (helical rim) a bilateral helical rim advancement flap was deemed most appropriate.  Using a sterile surgical marker, the appropriate advancement flaps were drawn incorporating the defect and placing the expected incisions between the helical rim and antihelix where possible.  The area thus outlined was incised through and through with a #15 scalpel blade.  With a skin hook and iris scissors, the flaps were gently and sharply undermined and freed up.
Bilateral Rotation Flap Text: The defect edges were debeveled with a #15 scalpel blade. Given the location of the defect, shape of the defect and the proximity to free margins a bilateral rotation flap was deemed most appropriate. Using a sterile surgical marker, an appropriate rotation flap was drawn incorporating the defect and placing the expected incisions within the relaxed skin tension lines where possible. The area thus outlined was incised deep to adipose tissue with a #15 scalpel blade. The skin margins were undermined to an appropriate distance in all directions utilizing iris scissors. Following this, the designed flap was carried over into the primary defect and sutured into place.
Bilobed Flap Text: The defect edges were debeveled with a #15 scalpel blade.  Given the location of the defect and the proximity to free margins a bilobe flap was deemed most appropriate.  Using a sterile surgical marker, an appropriate bilobed flap drawn around the defect.    The area thus outlined was incised deep to adipose tissue with a #15 scalpel blade and carried over to the primary defect.  The skin margins were undermined to an appropriate distance in all directions utilizing iris scissors.
Bilobed Transposition Flap Text: The defect edges were debeveled with a #15 scalpel blade.  Given the location of the defect and the proximity to free margins a bilobed transposition flap was deemed most appropriate.  Using a sterile surgical marker, an appropriate bilobe flap drawn around the defect.    The area thus outlined was incised deep to adipose tissue with a #15 scalpel blade and carried over to the primary defect.  The skin margins were undermined to an appropriate distance in all directions utilizing iris scissors.
Bi-Rhombic Flap Text: The defect edges were debeveled with a #15 scalpel blade.  Given the location of the defect and the proximity to free margins a bi-rhombic flap was deemed most appropriate.  Using a sterile surgical marker, an appropriate rhombic flap was drawn incorporating the defect. The area thus outlined was incised deep to adipose tissue with a #15 scalpel blade.  The skin margins were undermined to an appropriate distance in all directions utilizing iris scissors.
Burow's Advancement Flap Text: The defect edges were debeveled with a #15 scalpel blade.  Given the location of the defect and the proximity to free margins a Burow's advancement flap was deemed most appropriate.  Using a sterile surgical marker, the appropriate advancement flap was drawn incorporating the defect and placing the expected incisions within the relaxed skin tension lines where possible.    The area thus outlined was incised deep to adipose tissue with a #15 scalpel blade.  The skin margins were undermined to an appropriate distance in all directions utilizing iris scissors.
Chonodrocutaneous Helical Advancement Flap Text: The defect edges were debeveled with a #15 scalpel blade.  Given the location of the defect and the proximity to free margins a chondrocutaneous helical advancement flap was deemed most appropriate.  Using a sterile surgical marker, the appropriate advancement flap was drawn incorporating the defect and placing the expected incisions within the relaxed skin tension lines where possible.    The area thus outlined was incised deep to adipose tissue with a #15 scalpel blade.  The skin margins were undermined to an appropriate distance in all directions utilizing iris scissors.
Crescentic Advancement Flap Text: The defect edges were debeveled with a #15 scalpel blade.  Given the location of the defect and the proximity to free margins a crescentic advancement flap was deemed most appropriate.  Using a sterile surgical marker, the appropriate advancement flap was drawn incorporating the defect and placing the expected incisions within the relaxed skin tension lines where possible.    The area thus outlined was incised deep to adipose tissue with a #15 scalpel blade.  The skin margins were undermined to an appropriate distance in all directions utilizing iris scissors.
Dorsal Nasal Flap Text: The defect edges were debeveled with a #15 scalpel blade.  Given the location of the defect and the proximity to free margins a dorsal nasal flap was deemed most appropriate.  Using a sterile surgical marker, an appropriate dorsal nasal flap was drawn around the defect.    The area thus outlined was incised deep to adipose tissue with a #15 scalpel blade and carried over to the primary defect.  The skin margins were undermined to an appropriate distance in all directions utilizing iris scissors.
Double Island Pedicle Flap Text: The defect edges were debeveled with a #15 scalpel blade.  Given the location of the defect, shape of the defect and the proximity to free margins a double island pedicle advancement flap was deemed most appropriate.  Using a sterile surgical marker, an appropriate advancement flap was drawn incorporating the defect, outlining the appropriate donor tissue and placing the expected incisions within the relaxed skin tension lines where possible.    The area thus outlined was incised deep to adipose tissue with a #15 scalpel blade and carried over to the primary defect.  The skin margins were undermined to an appropriate distance in all directions around the primary defect and laterally outward around the island pedicle utilizing iris scissors.  There was minimal undermining beneath the pedicle flap.
Double O-Z Flap Text: The defect edges were debeveled with a #15 scalpel blade.  Given the location of the defect, shape of the defect and the proximity to free margins a Double O-Z flap was deemed most appropriate.  Using a sterile surgical marker, an appropriate transposition flap was drawn incorporating the defect and placing the expected incisions within the relaxed skin tension lines where possible. The area thus outlined was incised deep to adipose tissue with a #15 scalpel blade.  The skin margins were undermined to an appropriate distance in all directions utilizing iris scissors.
Double O-Z Plasty Text: The defect edges were debeveled with a #15 scalpel blade.  Given the location of the defect, shape of the defect and the proximity to free margins a Double O-Z plasty (double transposition flap) was deemed most appropriate.  Using a sterile surgical marker, the appropriate transposition flaps were drawn incorporating the defect and placing the expected incisions within the relaxed skin tension lines where possible. The area thus outlined was incised deep to adipose tissue with a #15 scalpel blade.  The skin margins were undermined to an appropriate distance in all directions utilizing iris scissors.  Hemostasis was achieved with electrocautery.  The flaps were then transposed into place, one clockwise and the other counterclockwise, and anchored with interrupted buried subcutaneous sutures.
Double Z Plasty Text: The lesion was extirpated to the level of the fat with a #15 scalpel blade. Given the location of the defect, shape of the defect and the proximity to free margins a double Z-plasty was deemed most appropriate for repair. Using a sterile surgical marker, the appropriate transposition arms of the double Z-plasty were drawn incorporating the defect and placing the expected incisions within the relaxed skin tension lines where possible. The area thus outlined was incised deep to adipose tissue with a #15 scalpel blade. The skin margins were undermined to an appropriate distance in all directions utilizing iris scissors. The opposing transposition arms were then transposed and carried over into place in opposite direction and anchored with interrupted buried subcutaneous sutures.
Ear Star Wedge Flap Text: The defect edges were debeveled with a #15 blade scalpel.  Given the location of the defect and the proximity to free margins (helical rim) an ear star wedge flap was deemed most appropriate.  Using a sterile surgical marker, the appropriate flap was drawn incorporating the defect and placing the expected incisions between the helical rim and antihelix where possible.  The area thus outlined was incised through and through with a #15 scalpel blade.
Flip-Flop Flap Text: The defect edges were debeveled with a #15 blade scalpel.  Given the location of the defect and the proximity to free margins a flip-flop flap was deemed most appropriate. Using a sterile surgical marker, the appropriate flap was drawn incorporating the defect and placing the expected incisions between the helical rim and antihelix where possible.  The area thus outlined was incised through and through with a #15 scalpel blade. Following this, the designed flap was carried over into the primary defect and sutured into place.
Hatchet Flap Text: The defect edges were debeveled with a #15 scalpel blade.  Given the location of the defect, shape of the defect and the proximity to free margins a hatchet flap was deemed most appropriate.  Using a sterile surgical marker, an appropriate hatchet flap was drawn incorporating the defect and placing the expected incisions within the relaxed skin tension lines where possible.    The area thus outlined was incised deep to adipose tissue with a #15 scalpel blade and carried over to the primary defect.  The skin margins were undermined to an appropriate distance in all directions utilizing iris scissors.
Helical Rim Advancement Flap Text: The defect edges were debeveled with a #15 blade scalpel.  Given the location of the defect and the proximity to free margins (helical rim) a double helical rim advancement flap was deemed most appropriate.  Using a sterile surgical marker, the appropriate advancement flaps were drawn incorporating the defect and placing the expected incisions between the helical rim and antihelix where possible.  The area thus outlined was incised through and through with a #15 scalpel blade and carried over to the primary defect.  With a skin hook and iris scissors, the flaps were gently and sharply undermined and freed up.
H Plasty Text: Given the location of the defect, shape of the defect and the proximity to free margins a H-plasty was deemed most appropriate for repair.  Using a sterile surgical marker, the appropriate advancement arms of the H-plasty were drawn incorporating the defect and placing the expected incisions within the relaxed skin tension lines where possible. The area thus outlined was incised deep to adipose tissue with a #15 scalpel blade. The skin margins were undermined to an appropriate distance in all directions utilizing iris scissors.  The opposing advancement arms were then advanced into place in opposite direction and anchored with interrupted buried subcutaneous sutures.
Island Pedicle Flap Text: The defect edges were debeveled with a #15 scalpel blade.  Given the location of the defect, shape of the defect and the proximity to free margins an island pedicle advancement flap was deemed most appropriate.  Using a sterile surgical marker, an appropriate advancement flap was drawn incorporating the defect, outlining the appropriate donor tissue and placing the expected incisions within the relaxed skin tension lines where possible.    The area thus outlined was incised deep to adipose tissue with a #15 scalpel blade and carried over to the primary defect.  The skin margins were undermined to an appropriate distance in all directions around the primary defect and laterally outward around the island pedicle utilizing iris scissors.  There was minimal undermining beneath the pedicle flap.
Island Pedicle Flap With Canthal Suspension Text: The defect edges were debeveled with a #15 scalpel blade.  Given the location of the defect, shape of the defect and the proximity to free margins an island pedicle advancement flap was deemed most appropriate.  Using a sterile surgical marker, an appropriate advancement flap was drawn incorporating the defect, outlining the appropriate donor tissue and placing the expected incisions within the relaxed skin tension lines where possible. The area thus outlined was incised deep to adipose tissue with a #15 scalpel blade.  The skin margins were undermined to an appropriate distance in all directions around the primary defect and laterally outward around the island pedicle utilizing iris scissors.  There was minimal undermining beneath the pedicle flap. A suspension suture was placed in the canthal tendon to prevent tension and prevent ectropion.
Island Pedicle Flap-Requiring Vessel Identification Text: The defect edges were debeveled with a #15 scalpel blade.  Given the location of the defect, shape of the defect and the proximity to free margins an island pedicle advancement flap was deemed most appropriate.  Using a sterile surgical marker, an appropriate advancement flap was drawn, based on the axial vessel mentioned above, incorporating the defect, outlining the appropriate donor tissue and placing the expected incisions within the relaxed skin tension lines where possible.    The area thus outlined was incised deep to adipose tissue with a #15 scalpel blade.  The skin margins were undermined to an appropriate distance in all directions around the primary defect and laterally outward around the island pedicle utilizing iris scissors.  There was minimal undermining beneath the pedicle flap.
Keystone Flap Text: The defect edges were debeveled with a #15 scalpel blade.  Given the location of the defect, shape of the defect a keystone flap was deemed most appropriate.  Using a sterile surgical marker, an appropriate keystone flap was drawn incorporating the defect, outlining the appropriate donor tissue and placing the expected incisions within the relaxed skin tension lines where possible. The area thus outlined was incised deep to adipose tissue with a #15 scalpel blade and carried over to the primary defect.  The skin margins were undermined to an appropriate distance in all directions around the primary defect and laterally outward around the flap utilizing iris scissors.
Melolabial Transposition Flap Text: The defect edges were debeveled with a #15 scalpel blade.  Given the location of the defect and the proximity to free margins a melolabial flap was deemed most appropriate.  Using a sterile surgical marker, an appropriate melolabial transposition flap was drawn incorporating the defect.    The area thus outlined was incised deep to adipose tissue with a #15 scalpel blade and carried over to the primary defect.  The skin margins were undermined to an appropriate distance in all directions utilizing iris scissors.
Mercedes Flap Text: The defect edges were debeveled with a #15 scalpel blade.  Given the location of the defect, shape of the defect and the proximity to free margins a Mercedes flap was deemed most appropriate.  Using a sterile surgical marker, an appropriate advancement flap was drawn incorporating the defect and placing the expected incisions within the relaxed skin tension lines where possible. The area thus outlined was incised deep to adipose tissue with a #15 scalpel blade and carried over to the primary defect.  The skin margins were undermined to an appropriate distance in all directions utilizing iris scissors.
Modified Advancement Flap Text: The defect edges were debeveled with a #15 scalpel blade.  Given the location of the defect, shape of the defect and the proximity to free margins a three point advancement flap was deemed most appropriate.  Using a sterile surgical marker, an appropriate advancement flap was drawn incorporating the defect and placing the expected incisions within the relaxed skin tension lines where possible.   The area thus outlined was incised deep to adipose tissue with a #15 scalpel blade.  Three standing tissue cones were excised.  The skin margins were undermined to an appropriate distance in all directions utilizing iris scissors.
Mucosal Advancement Flap Text: Given the location of the defect, shape of the defect and the proximity to free margins a mucosal advancement flap was deemed most appropriate. Incisions were made with a 15 blade scalpel in the appropriate fashion along the cutaneous vermilion border and the mucosal lip. The remaining actinically damaged mucosal tissue was excised.  The mucosal advancement flap was then elevated to the gingival sulcus with care taken to preserve the neurovascular structures and advanced into the primary defect. Care was taken to ensure that precise realignment of the vermilion border was achieved.
Muscle Hinge Flap Text: The defect edges were debeveled with a #15 scalpel blade.  Given the size, depth and location of the defect and the proximity to free margins a muscle hinge flap was deemed most appropriate.  Using a sterile surgical marker, an appropriate hinge flap was drawn incorporating the defect. The area thus outlined was incised with a #15 scalpel blade.  The skin margins were undermined to an appropriate distance in all directions utilizing iris scissors.
Mustarde Flap Text: The defect edges were debeveled with a #15 scalpel blade.  Given the size, depth and location of the defect and the proximity to free margins a Mustarde flap was deemed most appropriate.  Using a sterile surgical marker, an appropriate flap was drawn incorporating the defect. The area thus outlined was incised with a #15 scalpel blade and carried over to the primary defect.  The skin margins were undermined to an appropriate distance in all directions utilizing iris scissors.
Nasal Turnover Hinge Flap Text: The defect edges were debeveled with a #15 scalpel blade.  Given the size, depth, location of the defect and the defect being full thickness a nasal turnover hinge flap was deemed most appropriate.  Using a sterile surgical marker, an appropriate hinge flap was drawn incorporating the defect. The area thus outlined was incised with a #15 scalpel blade and carried over to the primary defect. The flap was designed to recreate the nasal mucosal lining and the alar rim. The skin margins were undermined to an appropriate distance in all directions utilizing iris scissors.
Nasalis-Muscle-Based Myocutaneous Island Pedicle Flap Text: Using a #15 blade, an incision was made around the donor flap to the level of the nasalis muscle. Wide lateral undermining was then performed in both the subcutaneous plane above the nasalis muscle, and in a submuscular plane just above periosteum. This allowed the formation of a free nasalis muscle axial pedicle (based on the angular artery) which was still attached to the actual cutaneous flap, increasing its mobility and vascular viability. Hemostasis was obtained with pinpoint electrocoagulation. The flap was mobilized into position and the pivotal anchor points positioned and stabilized with buried interrupted sutures. Subcutaneous and dermal tissues were closed in a multilayered fashion with sutures. Tissue redundancies were excised, and the epidermal edges were apposed without significant tension and sutured with sutures.
Nasalis Myocutaneous Flap Text: Using a #15 blade, an incision was made around the donor flap to the level of the nasalis muscle. Wide lateral undermining was then performed in both the subcutaneous plane above the nasalis muscle, and in a submuscular plane just above periosteum. This allowed the formation of a free nasalis muscle axial pedicle which was still attached to the actual cutaneous flap, increasing its mobility and vascular viability. Hemostasis was obtained with pinpoint electrocoagulation. The flap was mobilized into position and the pivotal anchor points positioned and stabilized with buried interrupted sutures. Subcutaneous and dermal tissues were closed in a multilayered fashion with sutures. Tissue redundancies were excised, and the epidermal edges were apposed without significant tension and sutured with sutures.
Nasolabial Transposition Flap Text: The defect edges were debeveled with a #15 scalpel blade.  Given the size, depth and location of the defect and the proximity to free margins a nasolabial transposition flap was deemed most appropriate. Using a sterile surgical marker, an appropriate flap was drawn incorporating the defect. The area thus outlined was incised with a #15 scalpel blade. The skin margins were undermined to an appropriate distance in all directions utilizing iris scissors. Following this, the designed flap was carried into the primary defect and sutured into place.
Orbicularis Oris Muscle Flap Text: The defect edges were debeveled with a #15 scalpel blade.  Given that the defect affected the competency of the oral sphincter an orbicularis oris muscle flap was deemed most appropriate to restore this competency and normal muscle function.  Using a sterile surgical marker, an appropriate flap was drawn incorporating the defect. The area thus outlined was incised with a #15 scalpel blade.
O-T Advancement Flap Text: The defect edges were debeveled with a #15 scalpel blade.  Given the location of the defect, shape of the defect and the proximity to free margins an O-T advancement flap was deemed most appropriate.  Using a sterile surgical marker, an appropriate advancement flap was drawn incorporating the defect and placing the expected incisions within the relaxed skin tension lines where possible.    The area thus outlined was incised deep to adipose tissue with a #15 scalpel blade and carried over to the primary defect.  The skin margins were undermined to an appropriate distance in all directions utilizing iris scissors.
O-T Plasty Text: The defect edges were debeveled with a #15 scalpel blade.  Given the location of the defect, shape of the defect and the proximity to free margins an O-T plasty was deemed most appropriate.  Using a sterile surgical marker, an appropriate O-T plasty was drawn incorporating the defect and placing the expected incisions within the relaxed skin tension lines where possible.    The area thus outlined was incised deep to adipose tissue with a #15 scalpel blade.  The skin margins were undermined to an appropriate distance in all directions utilizing iris scissors.
O-L Flap Text: The defect edges were debeveled with a #15 scalpel blade.  Given the location of the defect, shape of the defect and the proximity to free margins an O-L flap was deemed most appropriate.  Using a sterile surgical marker, an appropriate advancement flap was drawn incorporating the defect and placing the expected incisions within the relaxed skin tension lines where possible.    The area thus outlined was incised deep to adipose tissue with a #15 scalpel blade.  The skin margins were undermined to an appropriate distance in all directions utilizing iris scissors.
O-Z Flap Text: The defect edges were debeveled with a #15 scalpel blade.  Given the location of the defect, shape of the defect and the proximity to free margins an O-Z flap was deemed most appropriate.  Using a sterile surgical marker, an appropriate transposition flap was drawn incorporating the defect and placing the expected incisions within the relaxed skin tension lines where possible. The area thus outlined was incised deep to adipose tissue with a #15 scalpel blade and carried over to the primary defect.  The skin margins were undermined to an appropriate distance in all directions utilizing iris scissors.
O-Z Plasty Text: The defect edges were debeveled with a #15 scalpel blade.  Given the location of the defect, shape of the defect and the proximity to free margins an O-Z plasty (double transposition flap) was deemed most appropriate.  Using a sterile surgical marker, the appropriate transposition flaps were drawn incorporating the defect and placing the expected incisions within the relaxed skin tension lines where possible.    The area thus outlined was incised deep to adipose tissue with a #15 scalpel blade.  The skin margins were undermined to an appropriate distance in all directions utilizing iris scissors.  Hemostasis was achieved with electrocautery.  The flaps were then transposed into place, one clockwise and the other counterclockwise, and anchored with interrupted buried subcutaneous sutures.
Peng Advancement Flap Text: The defect edges were debeveled with a #15 scalpel blade.  Given the location of the defect, shape of the defect and the proximity to free margins a Peng advancement flap was deemed most appropriate.  Using a sterile surgical marker, an appropriate advancement flap was drawn incorporating the defect and placing the expected incisions within the relaxed skin tension lines where possible. The area thus outlined was incised deep to adipose tissue with a #15 scalpel blade and carried over to the primary defect.  The skin margins were undermined to an appropriate distance in all directions utilizing iris scissors.
Rectangular Flap Text: The defect edges were debeveled with a #15 scalpel blade. Given the location of the defect and the proximity to free margins a rectangular flap was deemed most appropriate. Using a sterile surgical marker, an appropriate rectangular flap was drawn incorporating the defect. The area thus outlined was incised deep to adipose tissue with a #15 scalpel blade. The skin margins were undermined to an appropriate distance in all directions utilizing iris scissors. Following this, the designed flap was carried over into the primary defect and sutured into place.
Rhombic Flap Text: The defect edges were debeveled with a #15 scalpel blade.  Given the location of the defect and the proximity to free margins a rhombic flap was deemed most appropriate.  Using a sterile surgical marker, an appropriate rhombic flap was drawn incorporating the defect.    The area thus outlined was incised deep to adipose tissue with a #15 scalpel blade and carried over to the primary defect.  The skin margins were undermined to an appropriate distance in all directions utilizing iris scissors.
Rhomboid Transposition Flap Text: The defect edges were debeveled with a #15 scalpel blade.  Given the location of the defect and the proximity to free margins a rhomboid transposition flap was deemed most appropriate.  Using a sterile surgical marker, an appropriate rhomboid flap was drawn incorporating the defect.    The area thus outlined was incised deep to adipose tissue with a #15 scalpel blade.  The skin margins were undermined to an appropriate distance in all directions utilizing iris scissors.
Rotation Flap Text: The defect edges were debeveled with a #15 scalpel blade.  Given the location of the defect, shape of the defect and the proximity to free margins a rotation flap was deemed most appropriate.  Using a sterile surgical marker, an appropriate rotation flap was drawn incorporating the defect and placing the expected incisions within the relaxed skin tension lines where possible.    The area thus outlined was incised deep to adipose tissue with a #15 scalpel blade and carried over to the primary defect.  The skin margins were undermined to an appropriate distance in all directions utilizing iris scissors.
Spiral Flap Text: The defect edges were debeveled with a #15 scalpel blade.  Given the location of the defect, shape of the defect and the proximity to free margins a spiral flap was deemed most appropriate.  Using a sterile surgical marker, an appropriate rotation flap was drawn incorporating the defect and placing the expected incisions within the relaxed skin tension lines where possible. The area thus outlined was incised deep to adipose tissue with a #15 scalpel blade and carried over to the primary defect.  The skin margins were undermined to an appropriate distance in all directions utilizing iris scissors.
Staged Advancement Flap Text: The defect edges were debeveled with a #15 scalpel blade.  Given the location of the defect, shape of the defect and the proximity to free margins a staged advancement flap was deemed most appropriate.  Using a sterile surgical marker, an appropriate advancement flap was drawn incorporating the defect and placing the expected incisions within the relaxed skin tension lines where possible. The area thus outlined was incised deep to adipose tissue with a #15 scalpel blade.  The skin margins were undermined to an appropriate distance in all directions utilizing iris scissors.
Star Wedge Flap Text: The defect edges were debeveled with a #15 scalpel blade.  Given the location of the defect, shape of the defect and the proximity to free margins a star wedge flap was deemed most appropriate.  Using a sterile surgical marker, an appropriate rotation flap was drawn incorporating the defect and placing the expected incisions within the relaxed skin tension lines where possible. The area thus outlined was incised deep to adipose tissue with a #15 scalpel blade.  The skin margins were undermined to an appropriate distance in all directions utilizing iris scissors.
Transposition Flap Text: The defect edges were debeveled with a #15 scalpel blade.  Given the location of the defect and the proximity to free margins a transposition flap was deemed most appropriate.  Using a sterile surgical marker, an appropriate transposition flap was drawn incorporating the defect.    The area thus outlined was incised deep to adipose tissue with a #15 scalpel blade and carried over to the primary defect.  The skin margins were undermined to an appropriate distance in all directions utilizing iris scissors.
Trilobed Flap Text: The defect edges were debeveled with a #15 scalpel blade.  Given the location of the defect and the proximity to free margins a trilobed flap was deemed most appropriate.  Using a sterile surgical marker, an appropriate trilobed flap drawn around the defect.    The area thus outlined was incised deep to adipose tissue with a #15 scalpel blade and carried over to the primary defect.  The skin margins were undermined to an appropriate distance in all directions utilizing iris scissors.
V-Y Flap Text: The defect edges were debeveled with a #15 scalpel blade.  Given the location of the defect, shape of the defect and the proximity to free margins a V-Y flap was deemed most appropriate.  Using a sterile surgical marker, an appropriate advancement flap was drawn incorporating the defect and placing the expected incisions within the relaxed skin tension lines where possible.    The area thus outlined was incised deep to adipose tissue with a #15 scalpel blade.  The skin margins were undermined to an appropriate distance in all directions utilizing iris scissors.
V-Y Plasty Text: The defect edges were debeveled with a #15 scalpel blade.  Given the location of the defect, shape of the defect and the proximity to free margins an V-Y advancement flap was deemed most appropriate.  Using a sterile surgical marker, an appropriate advancement flap was drawn incorporating the defect and placing the expected incisions within the relaxed skin tension lines where possible.    The area thus outlined was incised deep to adipose tissue with a #15 scalpel blade.  The skin margins were undermined to an appropriate distance in all directions utilizing iris scissors.
W Plasty Text: The lesion was extirpated to the level of the fat with a #15 scalpel blade.  Given the location of the defect, shape of the defect and the proximity to free margins a W-plasty was deemed most appropriate for repair.  Using a sterile surgical marker, the appropriate transposition arms of the W-plasty were drawn incorporating the defect and placing the expected incisions within the relaxed skin tension lines where possible.    The area thus outlined was incised deep to adipose tissue with a #15 scalpel blade.  The skin margins were undermined to an appropriate distance in all directions utilizing iris scissors.  The opposing transposition arms were then transposed into place in opposite direction and anchored with interrupted buried subcutaneous sutures.
Z Plasty Text: The lesion was extirpated to the level of the fat with a #15 scalpel blade.  Given the location of the defect, shape of the defect and the proximity to free margins a Z-plasty was deemed most appropriate for repair.  Using a sterile surgical marker, the appropriate transposition arms of the Z-plasty were drawn incorporating the defect and placing the expected incisions within the relaxed skin tension lines where possible.    The area thus outlined was incised deep to adipose tissue with a #15 scalpel blade.  The skin margins were undermined to an appropriate distance in all directions utilizing iris scissors.  The opposing transposition arms were then transposed into place in opposite direction and anchored with interrupted buried subcutaneous sutures.
Zygomaticofacial Flap Text: Given the location of the defect, shape of the defect and the proximity to free margins a zygomaticofacial flap was deemed most appropriate for repair.  Using a sterile surgical marker, the appropriate flap was drawn incorporating the defect and placing the expected incisions within the relaxed skin tension lines where possible. The area thus outlined was incised deep to adipose tissue with a #15 scalpel blade with preservation of a vascular pedicle.  The skin margins were undermined to an appropriate distance in all directions utilizing iris scissors.  The flap was then placed into the defect and anchored with interrupted buried subcutaneous sutures.
Abbe Flap (Lower To Upper Lip) Text: The defect of the upper lip was assessed and measured.  Given the location and size of the defect, an Abbe flap was deemed most appropriate.  Using a sterile surgical marker, an appropriate Abbe flap was measured and drawn on the lower lip. Local anesthesia was then infiltrated. A scalpel was then used to incise the upper lip through and through the skin, vermilion, muscle and mucosa, leaving the flap pedicled on the opposite side.  The flap was then rotated and transferred to the lower lip defect.  The flap was then sutured into place with a three layer technique, closing the orbicularis oris muscle layer with subcutaneous buried sutures, followed by a mucosal layer and an epidermal layer.
Abbe Flap (Upper To Lower Lip) Text: The defect of the lower lip was assessed and measured.  Given the location and size of the defect, an Abbe flap was deemed most appropriate.  Using a sterile surgical marker, an appropriate Abbe flap was measured and drawn on the upper lip. Local anesthesia was then infiltrated.  A scalpel was then used to incise the upper lip through and through the skin, vermilion, muscle and mucosa, leaving the flap pedicled on the opposite side.  The flap was then rotated and transferred to the lower lip defect.  The flap was then sutured into place with a three layer technique, closing the orbicularis oris muscle layer with subcutaneous buried sutures, followed by a mucosal layer and an epidermal layer.
Cheek Interpolation Flap Text: A decision was made to reconstruct the defect utilizing an interpolation axial flap and a staged reconstruction.  A telfa template was made of the defect.  This telfa template was then used to outline the Cheek Interpolation flap.  The donor area for the pedicle flap was then injected with anesthesia.  The flap was excised through the skin and subcutaneous tissue down to the layer of the underlying musculature.  The interpolation flap was carefully excised within this deep plane to maintain its blood supply.  The edges of the donor site were undermined.   The donor site was closed in a primary fashion.  The pedicle was then carried over to the primary defect, rotated into position and sutured.  Once the tube was sutured into place, adequate blood supply was confirmed with blanching and refill.  The pedicle was then wrapped with xeroform gauze and dressed appropriately with a telfa and gauze bandage to ensure continued blood supply and protect the attached pedicle.
Cheek-To-Nose Interpolation Flap Text: A decision was made to reconstruct the defect utilizing an interpolation axial flap and a staged reconstruction.  A telfa template was made of the defect.  This telfa template was then used to outline the Cheek-To-Nose Interpolation flap.  The donor area for the pedicle flap was then injected with anesthesia.  The flap was excised through the skin and subcutaneous tissue down to the layer of the underlying musculature.  The interpolation flap was carefully excised within this deep plane to maintain its blood supply.  The edges of the donor site were undermined.   The donor site was closed in a primary fashion.  The pedicle was then and carried over to the primary defect, rotated into position and sutured.  Once the tube was sutured into place, adequate blood supply was confirmed with blanching and refill.  The pedicle was then wrapped with xeroform gauze and dressed appropriately with a telfa and gauze bandage to ensure continued blood supply and protect the attached pedicle.
Estlander Flap (Lower To Upper Lip) Text: The defect of the lower lip was assessed and measured.  Given the location and size of the defect, an Estlander flap was deemed most appropriate.  Using a sterile surgical marker, an appropriate Estlander flap was measured and drawn on the upper lip. Local anesthesia was then infiltrated. A scalpel was then used to incise the lateral aspect of the flap, through skin, muscle and mucosa, leaving the flap pedicled medially.  The flap was then rotated and positioned to fill the lower lip defect.  The flap was then sutured into place with a three layer technique, closing the orbicularis oris muscle layer with subcutaneous buried sutures, followed by a mucosal layer and an epidermal layer.
Interpolation Flap Text: A decision was made to reconstruct the defect utilizing an interpolation axial flap and a staged reconstruction.  A telfa template was made of the defect.  This telfa template was then used to outline the interpolation flap.  The donor area for the pedicle flap was then injected with anesthesia.  The flap was excised through the skin and subcutaneous tissue down to the layer of the underlying musculature.  The interpolation flap was carefully excised within this deep plane to maintain its blood supply.  The edges of the donor site were undermined.   The donor site was closed in a primary fashion.  The pedicle was then and carried over to the primary defect, rotated into position and sutured.  Once the tube was sutured into place, adequate blood supply was confirmed with blanching and refill.  The pedicle was then wrapped with xeroform gauze and dressed appropriately with a telfa and gauze bandage to ensure continued blood supply and protect the attached pedicle.
Melolabial Interpolation Flap Text: A decision was made to reconstruct the defect utilizing an interpolation axial flap and a staged reconstruction.  A telfa template was made of the defect.  This telfa template was then used to outline the melolabial interpolation flap.  The donor area for the pedicle flap was then injected with anesthesia.  The flap was excised through the skin and subcutaneous tissue down to the layer of the underlying musculature.  The pedicle flap was carefully excised within this deep plane to maintain its blood supply.  The edges of the donor site were undermined.   The donor site was closed in a primary fashion.  The pedicle was then carried over to the primary defect, rotated into position and sutured.  Once the tube was sutured into place, adequate blood supply was confirmed with blanching and refill.  The pedicle was then wrapped with xeroform gauze and dressed appropriately with a telfa and gauze bandage to ensure continued blood supply and protect the attached pedicle.
Mastoid Interpolation Flap Text: A decision was made to reconstruct the defect utilizing an interpolation axial flap and a staged reconstruction.  A telfa template was made of the defect.  This telfa template was then used to outline the mastoid interpolation flap.  The donor area for the pedicle flap was then injected with anesthesia.  The flap was excised through the skin and subcutaneous tissue down to the layer of the underlying musculature.  The pedicle flap was carefully excised within this deep plane to maintain its blood supply.  The edges of the donor site were undermined.   The donor site was closed in a primary fashion.  The pedicle was then and carried over to the primary defect, rotated into position and sutured.  Once the tube was sutured into place, adequate blood supply was confirmed with blanching and refill.  The pedicle was then wrapped with xeroform gauze and dressed appropriately with a telfa and gauze bandage to ensure continued blood supply and protect the attached pedicle.
Paramedian Forehead Flap Text: A decision was made to reconstruct the defect utilizing an interpolation axial flap and a staged reconstruction.  A telfa template was made of the defect.  This telfa template was then used to outline the paramedian forehead pedicle flap.  The donor area for the pedicle flap was then injected with anesthesia.  The flap was excised through the skin and subcutaneous tissue down to the layer of the underlying musculature.  The pedicle flap was carefully excised within this deep plane to maintain its blood supply.  The edges of the donor site were undermined.   The donor site was closed in a primary fashion.  The pedicle was then rotated into position and sutured.  Once the tube was sutured into place, adequate blood supply was confirmed with blanching and refill.  The pedicle was then wrapped with xeroform gauze and dressed appropriately with a telfa and gauze bandage to ensure continued blood supply and protect the attached pedicle.
Posterior Auricular Interpolation Flap Text: A decision was made to reconstruct the defect utilizing an interpolation axial flap and a staged reconstruction.  A telfa template was made of the defect.  This telfa template was then used to outline the posterior auricular interpolation flap.  The donor area for the pedicle flap was then injected with anesthesia.  The flap was excised through the skin and subcutaneous tissue down to the layer of the underlying musculature.  The pedicle flap was carefully excised within this deep plane to maintain its blood supply.  The edges of the donor site were undermined.   The donor site was closed in a primary fashion.  The pedicle was then rotated into position and sutured.  Once the tube was sutured into place, adequate blood supply was confirmed with blanching and refill.  The pedicle was then wrapped with xeroform gauze and dressed appropriately with a telfa and gauze bandage to ensure continued blood supply and protect the attached pedicle.
Cheiloplasty (Complex) Text: A decision was made to reconstruct the defect with a  cheiloplasty.  The defect was undermined extensively.  Additional obicularis oris muscle was excised with a 15 blade scalpel.  The defect was converted into a full thickness wedge to facilite a better cosmetic result.  Small vessels were then tied off with 5-0 monocyrl. The obicularis oris, superficial fascia, adipose and dermis were then reapproximated.  After the deeper layers were approximated the epidermis was reapproximated with particular care given to realign the vermilion border.
Cheiloplasty (Less Than 50%) Text: A decision was made to reconstruct the defect with a  cheiloplasty.  The defect was undermined extensively.  Additional obicularis oris muscle was excised with a 15 blade scalpel.  The defect was converted into a full thickness wedge, of less than 50% of the vertical height of the lip, to facilite a better cosmetic result.  Small vessels were then tied off with 5-0 monocyrl. The obicularis oris, superficial fascia, adipose and dermis were then reapproximated.  After the deeper layers were approximated the epidermis was reapproximated with particular care given to realign the vermilion border.
Ear Wedge Repair Text: A wedge excision was completed by carrying down an excision through the full thickness of the ear and cartilage with an inward facing Burow's triangle. The wound was then closed in a layered fashion.
Full Thickness Lip Wedge Repair (Flap) Text: Given the location of the defect and the proximity to free margins a full thickness wedge repair was deemed most appropriate.  Using a sterile surgical marker, the appropriate repair was drawn incorporating the defect and placing the expected incisions perpendicular to the vermilion border.  The vermilion border was also meticulously outlined to ensure appropriate reapproximation during the repair.  The area thus outlined was incised through and through with a #15 scalpel blade.  The muscularis and dermis were reaproximated with deep sutures following hemostasis. Care was taken to realign the vermilion border before proceeding with the superficial closure.  Once the vermilion was realigned the superfical and mucosal closure was finished.
Burow's Graft Text: The defect edges were debeveled with a #15 scalpel blade.  Given the location of the defect, shape of the defect, the proximity to free margins and the presence of a standing cone deformity a Burow's skin graft was deemed most appropriate. The standing cone was removed and this tissue was then trimmed to the shape of the primary defect. The adipose tissue was also removed until only dermis and epidermis were left.  The skin margins of the secondary defect were undermined to an appropriate distance in all directions utilizing iris scissors.  The secondary defect was closed with interrupted buried subcutaneous sutures.  The skin edges were then re-apposed with running  sutures.  The skin graft was then placed in the primary defect and oriented appropriately.
Cartilage Graft Text: The defect edges were debeveled with a #15 scalpel blade.  Given the location of the defect, shape of the defect, the fact the defect involved a full thickness cartilage defect a cartilage graft was deemed most appropriate.  An appropriate donor site was identified, cleansed, and anesthetized. The cartilage graft was then harvested and transferred to the recipient site, oriented appropriately and then sutured into place.  The secondary defect was then repaired using a primary closure.
Composite Graft Text: The defect edges were debeveled with a #15 scalpel blade.  Given the location of the defect, shape of the defect, the proximity to free margins and the fact the defect was full thickness a composite graft was deemed most appropriate.  The defect was outline and then transferred to the donor site.  A full thickness graft was then excised from the donor site. The graft was then placed in the primary defect, oriented appropriately and then sutured into place.  The secondary defect was then repaired using a primary closure.
Epidermal Autograft Text: The defect edges were debeveled with a #15 scalpel blade.  Given the location of the defect, shape of the defect and the proximity to free margins an epidermal autograft was deemed most appropriate.  Using a sterile surgical marker, the primary defect shape was transferred to the donor site. The epidermal graft was then harvested.  The skin graft was then placed in the primary defect and oriented appropriately.
Dermal Autograft Text: The defect edges were debeveled with a #15 scalpel blade.  Given the location of the defect, shape of the defect and the proximity to free margins a dermal autograft was deemed most appropriate.  Using a sterile surgical marker, the primary defect shape was transferred to the donor site. The area thus outlined was incised deep to adipose tissue with a #15 scalpel blade.  The harvested graft was then trimmed of adipose and epidermal tissue until only dermis was left.  The skin graft was then placed in the primary defect and oriented appropriately.
Ftsg Text: The defect edges were debeveled with a #15 scalpel blade.  Given the location of the defect, shape of the defect and the proximity to free margins a full thickness skin graft was deemed most appropriate.  Using a sterile surgical marker, the primary defect shape was transferred to the donor site. The area thus outlined was incised deep to adipose tissue with a #15 scalpel blade.  The harvested graft was then trimmed of adipose tissue until only dermis and epidermis was left.  The skin margins of the secondary defect were undermined to an appropriate distance in all directions utilizing iris scissors.  The secondary defect was closed with interrupted buried subcutaneous sutures.  The skin edges were then re-apposed with running  sutures.  The skin graft was then placed in the primary defect and oriented appropriately.
Pinch Graft Text: The defect edges were debeveled with a #15 scalpel blade. Given the location of the defect, shape of the defect and the proximity to free margins a pinch graft was deemed most appropriate. Using a sterile surgical marker, the primary defect shape was transferred to the donor site. The area thus outlined was incised deep to adipose tissue with a #15 scalpel blade.  The harvested graft was then trimmed of adipose tissue until only dermis and epidermis was left. The skin margins of the secondary defect were undermined to an appropriate distance in all directions utilizing iris scissors.  The secondary defect was closed with interrupted buried subcutaneous sutures.  The skin edges were then re-apposed with running  sutures.  The skin graft was then placed in the primary defect and oriented appropriately.
Skin Substitute Text: The defect edges were debeveled with a #15 scalpel blade.  Given the location of the defect, shape of the defect and the proximity to free margins a skin substitute graft was deemed most appropriate.  The graft material was trimmed to fit the size of the defect. The graft was then placed in the primary defect and oriented appropriately.
Split-Thickness Skin Graft Text: The defect edges were debeveled with a #15 scalpel blade.  Given the location of the defect, shape of the defect and the proximity to free margins a split thickness skin graft was deemed most appropriate.  Using a sterile surgical marker, the primary defect shape was transferred to the donor site. The split thickness graft was then harvested.  The skin graft was then placed in the primary defect and oriented appropriately.
Tissue Cultured Epidermal Autograft Text: The defect edges were debeveled with a #15 scalpel blade.  Given the location of the defect, shape of the defect and the proximity to free margins a tissue cultured epidermal autograft was deemed most appropriate.  The graft was then trimmed to fit the size of the defect.  The graft was then placed in the primary defect and oriented appropriately.
Xenograft Text: The defect edges were debeveled with a #15 scalpel blade.  Given the location of the defect, shape of the defect and the proximity to free margins a xenograft was deemed most appropriate.  The graft was then trimmed to fit the size of the defect.  The graft was then placed in the primary defect and oriented appropriately.
Complex Repair And Flap Additional Text (Will Appearing After The Standard Complex Repair Text): The complex repair was not sufficient to completely close the primary defect. The remaining additional defect was repaired with the flap mentioned below.
Complex Repair And Graft Additional Text (Will Appearing After The Standard Complex Repair Text): The complex repair was not sufficient to completely close the primary defect. The remaining additional defect was repaired with the graft mentioned below.
Eyelid Full Thickness Repair - 99992: The eyelid defect was full thickness which required a wedge repair of the eyelid. Special care was taken to ensure that the eyelid margin was realligned when placing sutures.
Eyelid Partial Thickness Repair - 47196: The eyelid defect was partial thickness which required a wedge repair of the eyelid. Special care was taken to ensure that the eyelid margin was realligned when placing sutures.
Intermediate Repair And Flap Additional Text (Will Appearing After The Standard Complex Repair Text): The intermediate repair was not sufficient to completely close the primary defect. The remaining additional defect was repaired with the flap mentioned below.
Intermediate Repair And Graft Additional Text (Will Appearing After The Standard Complex Repair Text): The intermediate repair was not sufficient to completely close the primary defect. The remaining additional defect was repaired with the graft mentioned below.
Localized Dermabrasion With 15 Blade Text: The patient was draped in routine manner.  Localized dermabrasion using a 15 blade was performed in routine manner to papillary dermis. This spot dermabrasion is being performed to complete skin cancer reconstruction. It also will eliminate the other sun damaged precancerous cells that are known to be part of the regional effect of a lifetime's worth of sun exposure. This localized dermabrasion is therapeutic and should not be considered cosmetic in any regard.
Localized Dermabrasion With Sand Papertext: The patient was draped in routine manner.  Localized dermabrasion using sterile sand paper was performed in routine manner to papillary dermis. This spot dermabrasion is being performed to complete skin cancer reconstruction. It also will eliminate the other sun damaged precancerous cells that are known to be part of the regional effect of a lifetime's worth of sun exposure. This localized dermabrasion is therapeutic and should not be considered cosmetic in any regard.
Localized Dermabrasion With Wire Brush Text: The patient was draped in routine manner.  Localized dermabrasion using 3 x 17 mm wire brush was performed in routine manner to papillary dermis. This spot dermabrasion is being performed to complete skin cancer reconstruction. It also will eliminate the other sun damaged precancerous cells that are known to be part of the regional effect of a lifetime's worth of sun exposure. This localized dermabrasion is therapeutic and should not be considered cosmetic in any regard.
Purse String (Simple) Text: Given the location of the defect and the characteristics of the surrounding skin a purse string closure was deemed most appropriate.  Undermining was performed circumfirentially around the surgical defect.  A purse string suture was then placed and tightened.
Purse String (Intermediate) Text: Given the location of the defect and the characteristics of the surrounding skin a purse string intermediate closure was deemed most appropriate.  Undermining was performed circumfirentially around the surgical defect.  A purse string suture was then placed and tightened.
Partial Purse String (Simple) Text: Given the location of the defect and the characteristics of the surrounding skin a simple purse string closure was deemed most appropriate.  Undermining was performed circumfirentially around the surgical defect.  A purse string suture was then placed and tightened. Wound tension only allowed a partial closure of the circular defect.
Partial Purse String (Intermediate) Text: Given the location of the defect and the characteristics of the surrounding skin an intermediate purse string closure was deemed most appropriate.  Undermining was performed circumfirentially around the surgical defect.  A purse string suture was then placed and tightened. Wound tension only allowed a partial closure of the circular defect.
Tarsorrhaphy Text: A tarsorrhaphy was performed using Frost sutures.
Manual Repair Warning Statement: We plan on removing the manually selected variable below in favor of our much easier automatic structured text blocks found in the previous tab. We decided to do this to help make the flow better and give you the full power of structured data. Manual selection is never going to be ideal in our platform and I would encourage you to avoid using manual selection from this point on, especially since I will be sunsetting this feature. It is important that you do one of two things with the customized text below. First, you can save all of the text in a word file so you can have it for future reference. Second, transfer the text to the appropriate area in the Library tab. Lastly, if there is a flap or graft type which we do not have you need to let us know right away so I can add it in before the variable is hidden. No need to panic, we plan to give you roughly 6 months to make the change.
Same Histology In Subsequent Stages Text: The pattern and morphology of the tumor is as described in the first stage.
No Residual Tumor Seen Histology Text: Normal epidermis, dermis, and subcutaneous and/or deeper tissue (where applicable) were noted on frozen sections during Mohs Surgery. There were no malignant cells seen in the sections examined.
Inflammation Suggestive Of Cancer Camouflage Histology Text: There was a dense lymphocytic infiltrate which prevented adequate histologic evaluation of adjacent structures.
Incidental Squamous Cell Carcinoma In Situ Histology Text: Incidental squamous cell carcinoma in situ was noted.  The epidermis shows acanthosis with elongation and thickening of the rete ridges.  There are varying degrees of atypia seen within the epidermal cells.  The anaplastic cells are enlarged with hyperchromatic nuclei.  Multiple nucleated epidermal cells are present and parakeratotic nuclei.  Multiple nucleated epidermal cells are present and parakeratotic cells are seen in the stratum corneum.  The anaplastic cells appear to be confined to the epidermis without penetration of the dermal-epidermal junction.
Incidental Actinic Keratosis Histology Text: Actinic keratosis was noted on frozen sections.  The specimen contains foci of disordered epidermal cells confined to the epidermis.  The cells have anaplastic nuclei with parakeratosis and a thickened granular cell layer.  There is papillomatosis and some hyperkeratosis.
Bcc Histology Text: The dermis contains distinctive tumor cell masses of various shapes and sizes composed of cells with large oval or elongated nuclei with relatively little cytoplasm.  The nuclei are homogenous.  There is no pronounced variation in size or intensity of staining and no significant anaplastic appearance.  The cells at the outer aspect of the tumor masses show palisading of nuclei.  Tumor masses are surrounded by a connective tissue stroma and in some areas there is retraction artifact of the tumor nodule away from the surrounding stroma.
Bcc Infiltrative Histology Text: The dermis contains distinctive tumor cell masses of various shapes and sizes composed of cells with large oval or elongated nuclei with relatively little cytoplasm.  The nuclei are homogenous.  There is no pronounced variation in size or intensity of staining and no significant anaplastic appearance.  The cells at the outer aspect of the tumor masses show palisading of nuclei.  Tumor masses are surrounded by a connective tissue stroma and in some areas there is retraction artifact of the tumor nodule away from the surrounding stroma.  The tumor nests are sharply angulated and demonstrating an infiltrating pattern extending deeply with surrounding fibrosis.
Bcc Infundibulocystic Histology Text: The dermis contains distinctive tumor cell masses of various shapes and sizes composed of cells with large oval or elongated nuclei with relatively little cytoplasm.  The nuclei are homogenous.  There is no pronounced variation in size or intensity of staining and no significant anaplastic appearance.  The cells at the outer aspect of the tumor masses show palisading of nuclei.  Tumor masses are surrounded by a connective tissue stroma and in some areas there is retraction artifact of the tumor nodule away from the surrounding stroma.  An infundibulocystic histology is noted.
Bcc Macronodular Histology Text: The dermis contains distinctive tumor cell masses of various shapes and sizes composed of cells with large oval or elongated nuclei with relatively little cytoplasm.  The nuclei are homogenous.  There is no pronounced variation in size or intensity of staining and no significant anaplastic appearance.  The cells at the outer aspect of the tumor masses show palisading of nuclei.  Tumor masses are surrounded by a connective tissue stroma and in some areas there is retraction artifact of the tumor nodule away from the surrounding stroma.  A macronodular histology is noted.
Bcc Micronodular Histology Text: The dermis contains distinctive tumor cell masses of various shapes and sizes composed of cells with large oval or elongated nuclei with relatively little cytoplasm.  The nuclei are homogenous.  There is no pronounced variation in size or intensity of staining and no significant anaplastic appearance.  The cells at the outer aspect of the tumor masses show palisading of nuclei.  Tumor masses are surrounded by a connective tissue stroma and in some areas there is retraction artifact of the tumor nodule away from the surrounding stroma.  A micronodular histology is noted.
Bcc  Morpheaform/Sclerosing Histology Text: The dermis contains distinctive tumor cell masses of various shapes and sizes composed of cells with large oval or elongated nuclei with relatively little cytoplasm.  The nuclei are homogenous.  There is no pronounced variation in size or intensity of staining and no significant anaplastic appearance.  The cells at the outer aspect of the tumor masses show palisading of nuclei.  Tumor masses are surrounded by a connective tissue stroma and in some areas there is retraction artifact of the tumor nodule away from the surrounding stroma.  The tumor nests are sharply angulated and demonstrating an infiltrating pattern extending deeply with surrounding fibrosis, consistent with morpheaform/sclerosing histology.
Bcc  Nodular Histology Text: The dermis contains distinctive tumor cell masses of various shapes and sizes composed of cells with large oval or elongated nuclei with relatively little cytoplasm.  The nuclei are homogenous.  There is no pronounced variation in size or intensity of staining and no significant anaplastic appearance.  The cells at the outer aspect of the tumor masses show palisading of nuclei.  Tumor masses are surrounded by a connective tissue stroma and in some areas there is retraction artifact of the tumor nodule away from the surrounding stroma.  A nodular histology is noted.
Bcc  Nodulocystic Histology Text: The dermis contains distinctive tumor cell masses of various shapes and sizes composed of cells with large oval or elongated nuclei with relatively little cytoplasm.  The nuclei are homogenous.  There is no pronounced variation in size or intensity of staining and no significant anaplastic appearance.  The cells at the outer aspect of the tumor masses show palisading of nuclei.  Tumor masses are surrounded by a connective tissue stroma and in some areas there is retraction artifact of the tumor nodule away from the surrounding stroma.  A nodulocystic histology is noted.
Bcc Pigmented Histology Text: The dermis contains distinctive tumor cell masses of various shapes and sizes composed of cells with large oval or elongated nuclei with relatively little cytoplasm.  The nuclei are homogenous.  There is no pronounced variation in size or intensity of staining and no significant anaplastic appearance.  The cells at the outer aspect of the tumor masses show palisading of nuclei.  Tumor masses are surrounded by a connective tissue stroma and in some areas there is retraction artifact of the tumor nodule away from the surrounding stroma.  Pigmented tumor cells are noted.
Bcc Superficial Histology Text: The dermis contains distinctive tumor cell masses of various shapes and sizes composed of cells with large oval or elongated nuclei with relatively little cytoplasm.  The nuclei are homogenous.  There is no pronounced variation in size or intensity of staining and no significant anaplastic appearance.  The cells at the outer aspect of the tumor masses show palisading of nuclei.  Tumor masses are surrounded by a connective tissue stroma and in some areas there is retraction artifact of the tumor nodule away from the surrounding stroma.  A superficial pattern is noted.
Bcc Superficial Pigmented Histology Text: The dermis contains distinctive tumor cell masses of various shapes and sizes composed of cells with large oval or elongated nuclei with relatively little cytoplasm.  The nuclei are homogenous.  There is no pronounced variation in size or intensity of staining and no significant anaplastic appearance.  The cells at the outer aspect of the tumor masses show palisading of nuclei.  Tumor masses are surrounded by a connective tissue stroma and in some areas there is retraction artifact of the tumor nodule away from the surrounding stroma.  A superficial pigmented pattern is noted.
Mixed Superficial And Nodular Bcc Histology Text: The dermis contains distinctive tumor cell masses of various shapes and sizes composed of cells with large oval or elongated nuclei with relatively little cytoplasm.  The nuclei are homogenous.  There is no pronounced variation in size or intensity of staining and no significant anaplastic appearance.  The cells at the outer aspect of the tumor masses show palisading of nuclei.  Tumor masses are surrounded by a connective tissue stroma and in some areas there is retraction artifact of the tumor nodule away from the surrounding stroma.  A mixed superficial and nodular pattern is noted.
Mixed Nodular And Infiltrative Bcc Histology Text: The dermis contains distinctive tumor cell masses of various shapes and sizes composed of cells with large oval or elongated nuclei with relatively little cytoplasm.  The nuclei are homogenous.  There is no pronounced variation in size or intensity of staining and no significant anaplastic appearance.  The cells at the outer aspect of the tumor masses show palisading of nuclei.  Tumor masses are surrounded by a connective tissue stroma and in some areas there is retraction artifact of the tumor nodule away from the surrounding stroma.  There is an admixture of nodular pattern BCC, as well as tumor nests that are sharply angulated and demonstrating an infiltrating pattern extending deeply with surrounding fibrosis.
Mixed Nodular And Micronodular Bcc Histology Text: The dermis contains distinctive tumor cell masses of various shapes and sizes composed of cells with large oval or elongated nuclei with relatively little cytoplasm.  The nuclei are homogenous.  There is no pronounced variation in size or intensity of staining and no significant anaplastic appearance.  The cells at the outer aspect of the tumor masses show palisading of nuclei.  Tumor masses are surrounded by a connective tissue stroma and in some areas there is retraction artifact of the tumor nodule away from the surrounding stroma.  A mixed nodular and micronodular pattern is noted.
Metatypical Bcc Histology Text: The dermis contains distinctive tumor cell masses of various shapes and sizes composed of cells with large oval or elongated nuclei with relatively little cytoplasm.  The nuclei are homogenous.  There is no pronounced variation in size or intensity of staining and no significant anaplastic appearance.  The cells at the outer aspect of the tumor masses show palisading of nuclei.  Tumor masses are surrounded by a connective tissue stroma and in some areas there is retraction artifact of the tumor nodule away from the surrounding stroma.  Squamous differentiation consistent with metatypical BCC is noted.
Scc Histology Text: There are irregular masses of epidermal cells in the upper dermis.  Within the tumor masses, there are varying proportions of normal squamous cells and anaplastic squamous cells.  The anaplastic cells have variation in size and shape with hyperplasia and hyperchromasia of the nuclei, absence of intracellular bridges and varying degrees of keratinization.
Scc Well Differentiated Histology Text: There are irregular masses of epidermal cells in the upper dermis.  Within the tumor masses, there are varying proportions of normal squamous cells and anaplastic squamous cells.  The anaplastic cells have variation in size and shape with hyperplasia and hyperchromasia of the nuclei, absence of intracellular bridges and varying degrees of keratinization.  A well differentiated pattern is noted.
Scc Moderately Differentiated Histology Text: There are irregular masses of epidermal cells in the upper dermis.  Within the tumor masses, there are varying proportions of normal squamous cells and anaplastic squamous cells.  The anaplastic cells have variation in size and shape with hyperplasia and hyperchromasia of the nuclei, absence of intracellular bridges and varying degrees of keratinization.  A moderately differentiated pattern is noted.
Scc Poorly Differentiated Histology Text: There are irregular masses of epidermal cells in the upper dermis.  Within the tumor masses, there are varying proportions of normal squamous cells and anaplastic squamous cells.  The anaplastic cells have variation in size and shape with hyperplasia and hyperchromasia of the nuclei, absence of intracellular bridges and varying degrees of keratinization.  A poorly differentiated pattern is noted.
Scc Acantholytic Histology Text: There are irregular masses of epidermal cells in the upper dermis.  Within the tumor masses, there are varying proportions of normal squamous cells and anaplastic squamous cells.  The anaplastic cells have variation in size and shape with hyperplasia and hyperchromasia of the nuclei, absence of intracellular bridges and varying degrees of keratinization.  An acantholytic pattern is noted.
Scc Ka Subtype Histology Text: On low power, a keratin filled invagination of the epidermis is noted to penetrate into the dermis.  There appear to be collections of epidermal cells with nuclear atypia in and around the base of the invagination.  These dyskeratotic cells show individual cells keratinization and appear eosinophilic.  A pronounced inflammatory cell infiltrate is noted in the surrounding dermis.  The surrounding epidermis extends as a buttress over the crater formed by the invagination.  At high magnification, the epidermal proliferations at the base of the crater show enlarged epidermal cells with hyperchromatic nuclei, consistent with SCC keratoacanthoma subtype.
Scc Spindle Histology Text: There are irregular masses of epidermal cells in the upper dermis.  Within the tumor masses, there are varying proportions of normal squamous cells and anaplastic squamous cells.  The anaplastic cells have variation in size and shape with hyperplasia and hyperchromasia of the nuclei, absence of intracellular bridges and varying degrees of keratinization.  A spindle cell pattern is noted.
Scc In Situ Histology Text: The epidermis shows acanthosis with elongation and thickening of the rete ridges.  There are varying degrees of atypia seen within the epidermal cells.  The anaplastic cells are enlarged with hyperchromatic nuclei.  Multiple nucleated epidermal cells are present and parakeratotic nuclei.  Multiple nucleated epidermal cells are present and parakeratotic cells are seen in the stratum corneum.  The anaplastic cells appear to be confined to the epidermis without penetration of the dermal- epidermal junction.
Lentigo Maligna Histology Text: Small clusters of uniformly round large melanocytes with atypical hyperchromatic nuclei and abundant cytoplasm are seen scattered throughout the lower levels of the epidermis.  Similar cells are seen singly in the upper layers of the epidermis.  There appears to be no penetration of these atypical melanocytes through the dermal epidermal junction and all cells appear to be confined to the epidermis.
Merkel Cell Carcinoma Histology Text: In the dermis and extending into the subcutaneous tissue, there are multiple anastomosing cords, clusters and sheets of basophilic cells.  These collections of cells do not appear to be connected to the epidermis.  Individual tumor cells have a very homogenous appearance.  They are closely spaced round ells that stain intensely blue with hematoxylin eosin.  These cells have vesiculare nuclei and scantly ill -defined cytoplasm.  Mitotic figures are seen.
Afx Histology Text: Mild to severe solar elastosis is noted.  Aggregates of dermal tumor cells  with moderate to severe pleomorphism with spindle, epithelioid, or multinucleated forms and atypical mitotic figures are seen.
Basosquamous Cell Carcinoma Histology Text: There are multiple islands of malignant cells throughout the dermis, exhibiting both basaloid and squamous differentiation. Some areas show BCC, with large and rounded basaloid cells, while others show SCC, with polygonal squamoid cells with abundant eosinophilic cytoplasm, large nuclei, and prominent nucleoli. The SCC component exhibited frequent atypical mitosis, cell and nuclear pleomorphism and hyperchromatism of the nuclei.
Dfsp Histology Text: In the dermis and subcutaneous fat there is a tumor mass consisting of cells with large spindle shaped nuclei embedded in varying amounts of collagen.  In some areas these cells are arranged in irregular bands while in others the intertwining bands result in a matte -like or a whorl -like fashion.  In some areas, the cells have enlarged nuclei with a moderate degree of atypia, and mitotic figures are seen.
Microcystic Adnexal Carcinoma Histology Text: Atypical epithelial cells are seen in the dermis arranged singly or in groups or organized as a cell-lined cystic cavity.  These cells are small in size with anaplastic appearing small nuclei.  When seen in the cystic configuration they resemble sweat ducts or glands.  In some areas the cells assume a spindle shape.  A slight stromal reaction is seen around the cells.
Sebaceous Carcinoma Histology Text: In the dermis, there are irregular lobules of varying size composed of undifferentiated sebaceous cells with foamy cytoplasm.  These cells, as well as their nuclei are of various sizes and shape.
Mart-1 - Positive Histology Text: MART-1 staining demonstrates areas of higher density and clustering of melanocytes with Pagetoid spread upwards within the epidermis. The surgical margins are positive for tumor cells.
Mart-1 - Negative Histology Text: MART-1 staining demonstrates a normal density and pattern of melanocytes along the dermal-epidermal junction. The surgical margins are negative for tumor cells.
Clia Id #: [id number]
Information: Selecting Yes will display possible errors in your note based on the variables you have selected. This validation is only offered as a suggestion for you. PLEASE NOTE THAT THE VALIDATION TEXT WILL BE REMOVED WHEN YOU FINALIZE YOUR NOTE. IF YOU WANT TO FAX A PRELIMINARY NOTE YOU WILL NEED TO TOGGLE THIS TO 'NO' IF YOU DO NOT WANT IT IN YOUR FAXED NOTE.
Bill 59 Modifier?: No - Continue to Bill 79 Modifier

## 2024-12-20 ENCOUNTER — APPOINTMENT (OUTPATIENT)
Dept: URBAN - METROPOLITAN AREA CLINIC 374 | Facility: CLINIC | Age: 69
Setting detail: DERMATOLOGY
End: 2024-12-20

## 2024-12-20 PROBLEM — C44.91 BASAL CELL CARCINOMA OF SKIN, UNSPECIFIED: Status: ACTIVE | Noted: 2024-12-20

## 2024-12-20 PROCEDURE — ? REFERRAL

## 2025-04-15 NOTE — PROCEDURE: MOHS SURGERY
Products Recommended: For cosmetic elegance on face, recommended EltaMD, LaRoche-Posay, Cotz, Colorescience, Alastin, Tizo. Detail Level: Detailed General Sunscreen Counseling: I recommended a physical (zinc or titanium based) water-resistant broad spectrum sunscreen with a SPF of 30 or higher and should be re-applied every 2-3 hours; pt also counseled on wearing hats and sun protective clothing with UPF. \\n\\nTold to call office if notices any new lesions of concern or changing lesions (new symptoms, change in size/symmetry) Estimated Blood Loss (Cc): minimal